# Patient Record
Sex: FEMALE | Race: BLACK OR AFRICAN AMERICAN | Employment: UNEMPLOYED | URBAN - METROPOLITAN AREA
[De-identification: names, ages, dates, MRNs, and addresses within clinical notes are randomized per-mention and may not be internally consistent; named-entity substitution may affect disease eponyms.]

---

## 2017-01-04 ENCOUNTER — TRANSFERRED RECORDS (OUTPATIENT)
Dept: HEALTH INFORMATION MANAGEMENT | Facility: CLINIC | Age: 37
End: 2017-01-04

## 2017-01-18 ENCOUNTER — TELEPHONE (OUTPATIENT)
Dept: ENDOCRINOLOGY | Facility: CLINIC | Age: 37
End: 2017-01-18

## 2017-01-18 NOTE — TELEPHONE ENCOUNTER
Incoming call from call center, spoke with pt, stating she is out of meds of Cortef and Levo, called [facility], they have 1 more refill of 3 months supply, they will dispense it today, reviewed scheduling options with pt and importance of being seen annually for further refills, scheduled return with Dr. Timmons on 3/24/17 1600. Mailed appt reminder with clinic address as well.

## 2017-03-01 DIAGNOSIS — E23.0 PANHYPOPITUITARISM (H): ICD-10-CM

## 2017-03-03 RX ORDER — LEVOTHYROXINE SODIUM 125 UG/1
125 TABLET ORAL DAILY
Qty: 90 TABLET | Refills: 3 | Status: SHIPPED | OUTPATIENT
Start: 2017-03-03 | End: 2018-04-05

## 2017-03-03 NOTE — TELEPHONE ENCOUNTER
levothyroxine (SYNTHROID/LEVOTHROID)       Last Written Prescription Date:  1-20-16  Last Fill Quantity: 90,   # refills: 3  Last Office Visit : 5-8-2015  Future Office visit:  3-24-17    Kathleen M Doege RN

## 2017-03-24 ENCOUNTER — OFFICE VISIT (OUTPATIENT)
Dept: ENDOCRINOLOGY | Facility: CLINIC | Age: 37
End: 2017-03-24

## 2017-03-24 VITALS
HEART RATE: 79 BPM | HEIGHT: 66 IN | SYSTOLIC BLOOD PRESSURE: 126 MMHG | DIASTOLIC BLOOD PRESSURE: 82 MMHG | WEIGHT: 197.2 LBS | BODY MASS INDEX: 31.69 KG/M2

## 2017-03-24 DIAGNOSIS — E66.9 OBESITY, UNSPECIFIED OBESITY SEVERITY, UNSPECIFIED OBESITY TYPE: ICD-10-CM

## 2017-03-24 DIAGNOSIS — R51.9 NONINTRACTABLE HEADACHE, UNSPECIFIED CHRONICITY PATTERN, UNSPECIFIED HEADACHE TYPE: ICD-10-CM

## 2017-03-24 DIAGNOSIS — R79.89 LOW IGF-1 LEVEL: ICD-10-CM

## 2017-03-24 DIAGNOSIS — R73.03 PRE-DIABETES: ICD-10-CM

## 2017-03-24 DIAGNOSIS — J35.1 ENLARGED TONSILS: ICD-10-CM

## 2017-03-24 DIAGNOSIS — E23.0 PANHYPOPITUITARISM (H): ICD-10-CM

## 2017-03-24 DIAGNOSIS — J35.1 ENLARGED TONSILS: Primary | ICD-10-CM

## 2017-03-24 LAB
HBA1C MFR BLD: 6 % (ref 4.3–6)
OSMOLALITY SERPL: 293 MMOL/KG (ref 275–295)
OSMOLALITY UR: 415 MMOL/KG (ref 100–1200)
PROLACTIN SERPL-MCNC: 2 UG/L (ref 3–27)
SODIUM SERPL-SCNC: 140 MMOL/L (ref 133–144)

## 2017-03-24 RX ORDER — HYDROCORTISONE 5 MG/1
TABLET ORAL
Qty: 400 TABLET | Refills: 3 | Status: SHIPPED | OUTPATIENT
Start: 2017-03-24 | End: 2017-03-27

## 2017-03-24 RX ORDER — NORETHINDRONE ACETATE AND ETHINYL ESTRADIOL .02; 1 MG/1; MG/1
1 TABLET ORAL DAILY
Qty: 28 TABLET | Refills: 11 | Status: SHIPPED | OUTPATIENT
Start: 2017-03-24 | End: 2019-07-08

## 2017-03-24 NOTE — LETTER
3/24/2017     RE: Octavio Idd  [address] [place] [address]     Dear Colleague,    Thank you for referring your patient, Octavio Jesus, to the [facility] ENDOCRINOLOGY at [facility]. Please see a copy of my visit note below.    Assessment   Panhypopituitarism   1. Central hypothyroidism.  Treat to high normal Free T4 target.  Free T4 was at target 6/16    2. partially empty sella - as per # 1, 3, 4, 5     3. 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 Rx for shirin 1/20 which is the OCP she had been getting in [place].      4. secondary adrenal insufficiency - New Rx for present dose HC 10/5 dose - QS for 3 months (# 400). We will call her pharmacy about lower quantities they have been dispensing.    5. Low IGF-1 consistent with possible GH deficiency. Current status unknown - repeat IGF1 and prolactin today    Obesity-     Headaches- repeat MRI     Decreased libido. Discussed.      Pre-diabetes based on labs obtained after the appt - not discussed.  Family history of DM.      Large tonsils. I had previously considered this might be causing her sleep apnea.  She says she has seen ENT in [facility] and was advised to have tonsillectomy. She is seeking 2nd opinion. Refer to ENT here .    Lyla Timmons MD.      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  She has had amenorrhea since the birth of her last child in 2001. The delivery had significant associated hemorrhage and required blood transfusion. Subsequently, she did not have another period. She breast fed the baby for a 2-3 weeks at most, was unable to continue.  I started Octavio on therapy in 2008.     She was last seen by me 5/15. At  that time she was on   HC 10 mg and 5 mg afternoon  LT4 125 mcg/day  She continues on these drugs.      I can see she saw Philomena Horn 1//17- had pelvic exam. She was off estrogen at the time.  Today she is showing me a package of OCPs - Holli 1/20 OCPS - she says she took them in the past and they helped her symptoms. She is asking me for Rx for this, preferred it to the premphase.     She carried in labs  6/20/16: TSH 0.015, free T4 1.39, vitamin D 32.6    ROS   Sleeps well at night;   Tired  Weight loss is due to effort - changed diet , works out; she has regained some of the weight she lost.    She saw ENT about tonsils - was advised to have tonsillectomy - she didn't want to do that.  She says she was to have 2nd opinion, not set up  Swallow is better than it used to bed  Cardiac: negative  GI: diarrhea always; if she skips a dose of HC she has diarrhea; no nausea, vomiting , abdominal pain  No menses since off the premphase. She was given another medication by her PCP - she used Holli 1/20 - a while ago - it worked well  Dizzy/ headache x 2 weeks  Decreased libido  Vaginal dryness   Excess urination every time she drinks     Personal Hx   Behavioral history: No tobacco use.   Home environment: No secondhand tobacco smoke in home.   ; lives in [place]; works 10 hour days 6-7 days/week, standing    Family history  Family History   Problem Relation Age of Onset     DIABETES Mother      Hypertension Father      PMH   Past Medical History:   Diagnosis Date     Central hypothyroidism      Empty sella (H)     partially empty     Hypogonadotropic hypogonadism (H)      Secondary adrenal insufficiency (H)      Patrick syndrome (H) 2001     Current Outpatient Prescriptions   Medication Sig Dispense Refill     VITAMIN D, CHOLECALCIFEROL, PO Take 50,000 Units by mouth once a week       hydrocortisone (CORTEF) 5 MG tablet 10 mg in the am and 5 mg in the afternoon.  Sick days take 20 mg three times/day for 1-2 days then  "back to baseline dose. 400 tablet 3     norethindrone-ethinyl estradiol (MICROGESTIN 1/20) 1-20 MG-MCG per tablet Take 1 tablet by mouth daily 28 tablet 11     levothyroxine (SYNTHROID/LEVOTHROID) 125 MCG tablet Take 1 tablet (125 mcg) by mouth daily 90 tablet 3     [DISCONTINUED] hydrocortisone (CORTEF) 5 MG tablet 10 mg in the am and 5 mg in the afternoon.  Sick days take 20 mg three times/day for 1-2 days then back to baseline dose. 100 tablet 3     Physical Exam   GENERAL: pleasant  young woman in no apparent distress.    /82 (BP Location: Right arm, Patient Position: Chair, Cuff Size: Adult Large)  Pulse 79  Ht 1.676 m (5' 6\")  Wt 89.4 kg (197 lb 3.2 oz)  BMI 31.83 kg/m2  SKIN: normal color, temperature   HEENT: PER, no scleral icterus, eyelid retraction, stare, lid lag, proptosis or conjunctival injection.   MOUTH: large tonsils bilaterally, right larger than left  NECK no goiter ; no cervical adenopathy  LUNGS: clear bilaterally   CARDIAC: RRR S1 S2   NEURO: Alert, responds appropriately to questions,moves all extremities, gait normal, no tremor   EXT: no pitting edema     DATA REVIEW:    Results for JHOANA LR (MRN [id number]) as of 3/23/2017 21:00   Ref. Range 11/22/2013 15:44 5/13/2014 00:00 5/8/2015 14:49   Potassium Latest Ref Range: 3.4 - 5.3 mmol/L   4.0   Bilirubin Total Latest Ref Range: 0.2 - 1.3 mg/dL   0.6   ALT Latest Ref Range: 0 - 50 U/L   40   AST Latest Ref Range: 0 - 45 U/L   33   Hemoglobin A1C Latest Ref Range: 4.3 - 6.0 % 5.2  6.0   Bilirubin Direct Latest Ref Range: 0.0 - 0.2 mg/dL   0.1   Estradiol Latest Units: pg/mL <30...     Prolactin Latest Ref Range: 3 - 27 ug/L 2 (L)     T4 Free Latest Ref Range: 0.76 - 1.46 ng/dL 1.29  1.52 (H)   TSH Latest Ref Range: 0.4 - 5.0 mU/L <0.02 (L)     Ins Growth Factor 1 Latest Ref Range: 103 - 391 ng/ml 27 (L)         Again, thank you for allowing me to participate in the care of your patient.      Sincerely,    Lyla Timmons, " MD

## 2017-03-24 NOTE — MR AVS SNAPSHOT
After Visit Summary   3/24/2017    Octavio Jesus    MRN: [id number]           Patient Information     Date Of Birth          1980        Visit Information        Provider Department      3/24/2017 3:45 PM Jaja Todd Lynn A, MD [facility] Endocrinology        Today's Diagnoses     Enlarged tonsils    -  1    Panhypopituitarism (H)        Nonintractable headache, unspecified chronicity pattern, unspecified headache type           Follow-ups after your visit        Additional Services     OTOLARYNGOLOGY REFERRAL       Your provider has referred you to: [facility]: Adult Ear, Nose [facility] Throat Clinic (Otolaryngology) - [place] [phone]  [email or web address]    Please be aware that coverage of these services is subject to the terms and limitations of your health insurance plan.  Call member services at your health plan with any benefit or coverage questions.      Please bring the following with you to your appointment:    (1) Any X-Rays, CTs or MRIs which have been performed.  Contact the facility where they were done to arrange for  prior to your scheduled appointment.   (2) List of current medications  (3) This referral request   (4) Any documents/labs given to you for this referral                  Follow-up notes from your care team     Return in about 6 months (around 9/24/2017).      Your next 10 appointments already scheduled     May 04, 2017 10:00 AM CDT   (Arrive by 9:45 AM)   New Patient Visit with Natalya Kim MD   [facility] Ear Nose and Throat ([facility] [place])    [address] [address] [phone]            May 04, 2017 12:00 PM CDT   MR BRAIN W/O & W CONTRAST with UUMR1   [facility] [place] ([facility] [place])    [address] [facility] [address] [phone]           Take your medicines as  usual, unless your doctor tells you not to. Bring a list of your current medicines to your exam (including vitamins, minerals and over-the-counter drugs).  You will be given intravenous contrast for this exam. To prepare:   The day before your exam, drink extra fluids at least six 8-ounce glasses (unless your doctor tells you to restrict your fluids).   Have a blood test (creatinine test) within 30 days of your exam. Go to your clinic or Diagnostic Imaging Department for this test.  The MRI machine uses a strong magnet. Please wear clothes without metal (snaps, zippers). A sweatsuit works well, or we may give you a hospital gown.  Please remove any body piercings and hair extensions before you arrive. You will also remove watches, jewelry, hairpins, wallets, dentures, partial dental plates and hearing aids. You may wear contact lenses, and you may be able to wear your rings. We have a safe place to keep your personal items, but it is safer to leave them at home.   **IMPORTANT** THE INSTRUCTIONS BELOW ARE ONLY FOR THOSE PATIENTS WHO HAVE BEEN TOLD THEY WILL RECEIVE SEDATION OR GENERAL ANESTHESIA DURING THEIR MRI PROCEDURE:  IF YOU WILL RECEIVE SEDATION (take medicine to help you relax during your exam):   You must get the medicine from your doctor before you arrive. Bring the medicine to the exam. Do not take it at home.   Arrive one hour early. Bring someone who can take you home after the test. Your medicine will make you sleepy. After the exam, you may not drive, take a bus or take a taxi by yourself.   No eating 8 hours before your exam. You may have clear liquids up until 4 hours before your exam. (Clear liquids include water, clear tea, black coffee and fruit juice without pulp.)  IF YOU WILL RECEIVE ANESTHESIA (be asleep for your exam):   Arrive 1 1/2 hours early. Bring someone who can take you home after the test. You may not drive, take a bus or take a taxi by yourself.   No eating 8 hours before your exam.  You may have clear liquids up until 4 hours before your exam. (Clear liquids include water, clear tea, black coffee and fruit juice without pulp.)  Please call the Imaging Department at your exam site with any questions.            Sep 26, 2017  1:30 PM CDT   (Arrive by 1:15 PM)   RETURN ENDOCRINE with Lyla Timmons MD   [facility] Endocrinology ([facility])    [address] [place] [id number] [phone]              Future tests that were ordered for you today     Open Future Orders        Priority Expected Expires Ordered    MRI Brain-PITUITARY  w & w/o contrast Routine  10/20/2017 3/24/2017    Hemoglobin A1c Routine  3/24/2018 3/24/2017    Osmolality Routine  3/24/2018 3/24/2017    Sodium Routine  3/24/2018 3/24/2017    Osmolality urine Routine  3/24/2018 3/24/2017    Insulin growth factor 1 Routine  3/24/2018 3/24/2017    Prolactin Routine  3/24/2018 3/24/2017            Who to contact     Please call your clinic at [phone] to:    Ask questions about your health    Make or cancel appointments    Discuss your medicines    Learn about your test results    Speak to your doctor   If you have compliments or concerns about an experience at your clinic, or if you wish to file a complaint, please contact [facility] Patient Relations at [phone] or email us at [email or web address]         Additional Information About Your Visit        [employer] Information     SendGridt is an electronic gateway that provides easy, online access to your medical records. With [employer], you can request a clinic appointment, read your test results, renew a prescription or communicate with your care team.     To sign up for SendGridt visit the website at [email or web address]   You will be asked to enter the access code listed below, as well as some personal information. Please follow the directions to create your username and password.    "  Your access code is: [email or web address]  Expires: 2017  6:00 PM     Your access code will  in 90 days. If you need help or a new code, please contact your [facility] or call [phone] for assistance.        Care EveryWhere ID     This is your Care EveryWhere ID. This could be used by other organizations to access your [place] medical records  [id number]        Your Vitals Were     Pulse Height BMI (Body Mass Index)             79 1.676 m (5' 6\") 31.83 kg/m2          Blood Pressure from Last 3 Encounters:   17 126/82   05/08/15 121/78   13 136/78    Weight from Last 3 Encounters:   17 89.4 kg (197 lb 3.2 oz)   05/08/15 96.8 kg (213 lb 8 oz)   13 92.7 kg (204 lb 6.4 oz)              We Performed the Following     OTOLARYNGOLOGY REFERRAL          Today's Medication Changes          These changes are accurate as of: 3/24/17  4:48 PM.  If you have any questions, ask your nurse or doctor.               Start taking these medicines.        Dose/Directions    norethindrone-ethinyl estradiol 1-20 MG-MCG per tablet   Commonly known as:  MICROGESTIN 1/20   Used for:  Panhypopituitarism (H), Enlarged tonsils, Nonintractable headache, unspecified chronicity pattern, unspecified headache type   Started by:  Lyla Timmons MD        Dose:  1 tablet   Take 1 tablet by mouth daily   Quantity:  28 tablet   Refills:  11            Where to get your medicines      These medications were sent to [id number] - [place] - [address] [place] [address]     Phone:  [phone]     hydrocortisone 5 MG tablet    norethindrone-ethinyl estradiol 1-20 MG-MCG per tablet                Primary Care Provider Office Phone # Fax #    Philomena Horn [phone]       [address] [address]        Thank you!     Thank you for choosing [facility]  for your care. Our goal is always to " provide you with excellent care. Hearing back from our patients is one way we can continue to improve our services. Please take a few minutes to complete the written survey that you may receive in the mail after your visit with us. Thank you!             Your Updated Medication List - Protect others around you: Learn how to safely use, store and throw away your medicines at www.disposemymeds.org.          This list is accurate as of: 3/24/17  4:48 PM.  Always use your most recent med list.                   Brand Name Dispense Instructions for use    hydrocortisone 5 MG tablet    CORTEF    400 tablet    10 mg in the am and 5 mg in the afternoon.  Sick days take 20 mg three times/day for 1-2 days then back to baseline dose.       levothyroxine 125 MCG tablet    SYNTHROID/LEVOTHROID    90 tablet    Take 1 tablet (125 mcg) by mouth daily       norethindrone-ethinyl estradiol 1-20 MG-MCG per tablet    MICROGESTIN 1/20    28 tablet    Take 1 tablet by mouth daily       VITAMIN D (CHOLECALCIFEROL) PO      Take 50,000 Units by mouth once a week

## 2017-03-24 NOTE — PROGRESS NOTES
Assessment   Panhypopituitarism   1. Central hypothyroidism.  Treat to high normal Free T4 target.  Free T4 was at target 6/16    2. partially empty sella - as per # 1, 3, 4, 5     3. 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 Rx for holli 1/20 which is the OCP she had been getting in [place].      4. secondary adrenal insufficiency - New Rx for present dose HC 10/5 dose - QS for 3 months (# 400). We will call her pharmacy about lower quantities they have been dispensing.    5. Low IGF-1 consistent with possible GH deficiency. Current status unknown - repeat IGF1 and prolactin today    Obesity-     Headaches- repeat MRI     Decreased libido. Discussed.      Pre-diabetes based on labs obtained after the appt - not discussed.  Family history of DM.      Large tonsils. I had previously considered this might be causing her sleep apnea.  She says she has seen ENT in [facility] and was advised to have tonsillectomy. She is seeking 2nd opinion. Refer to ENT here .    Lyla Timmons MD.      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  She has had amenorrhea since the birth of her last child in 2001. The delivery had significant associated hemorrhage and required blood transfusion. Subsequently, she did not have another period. She breast fed the baby for a 2-3 weeks at most, was unable to continue.  I started Octavio on therapy in 2008.     She was last seen by me 5/15. At that time she was on   HC 10 mg and 5 mg afternoon  LT4 125 mcg/day  She continues on these drugs.      I can see she saw Philomena Kory 1//17- had pelvic exam. She was off estrogen at the time.  Today she is showing me a package of OCPs - Holli 1/20 OCPS - she says she took them  in the past and they helped her symptoms. She is asking me for Rx for this, preferred it to the premphase.     She carried in labs  6/20/16: TSH 0.015, free T4 1.39, vitamin D 32.6    ROS   Sleeps well at night;   Tired  Weight loss is due to effort - changed diet , works out; she has regained some of the weight she lost.    She saw ENT about tonsils - was advised to have tonsillectomy - she didn't want to do that.  She says she was to have 2nd opinion, not set up  Swallow is better than it used to bed  Cardiac: negative  GI: diarrhea always; if she skips a dose of HC she has diarrhea; no nausea, vomiting , abdominal pain  No menses since off the premphase. She was given another medication by her PCP - she used Holli 1/20 - a while ago - it worked well  Dizzy/ headache x 2 weeks  Decreased libido  Vaginal dryness   Excess urination every time she drinks     Personal Hx   Behavioral history: No tobacco use.   Home environment: No secondhand tobacco smoke in home.   ; lives in [place]; works 10 hour days 6-7 days/week, standing    Family history  Family History   Problem Relation Age of Onset     DIABETES Mother      Hypertension Father      PMH   Past Medical History:   Diagnosis Date     Central hypothyroidism      Empty sella (H)     partially empty     Hypogonadotropic hypogonadism (H)      Secondary adrenal insufficiency (H)      Patrick syndrome (H) 2001     Current Outpatient Prescriptions   Medication Sig Dispense Refill     VITAMIN D, CHOLECALCIFEROL, PO Take 50,000 Units by mouth once a week       hydrocortisone (CORTEF) 5 MG tablet 10 mg in the am and 5 mg in the afternoon.  Sick days take 20 mg three times/day for 1-2 days then back to baseline dose. 400 tablet 3     norethindrone-ethinyl estradiol (MICROGESTIN 1/20) 1-20 MG-MCG per tablet Take 1 tablet by mouth daily 28 tablet 11     levothyroxine (SYNTHROID/LEVOTHROID) 125 MCG tablet Take 1 tablet (125 mcg) by mouth daily 90 tablet 3      "[DISCONTINUED] hydrocortisone (CORTEF) 5 MG tablet 10 mg in the am and 5 mg in the afternoon.  Sick days take 20 mg three times/day for 1-2 days then back to baseline dose. 100 tablet 3     Physical Exam   GENERAL: pleasant  young woman in no apparent distress.    /82 (BP Location: Right arm, Patient Position: Chair, Cuff Size: Adult Large)  Pulse 79  Ht 1.676 m (5' 6\")  Wt 89.4 kg (197 lb 3.2 oz)  BMI 31.83 kg/m2  SKIN: normal color, temperature   HEENT: PER, no scleral icterus, eyelid retraction, stare, lid lag, proptosis or conjunctival injection.   MOUTH: large tonsils bilaterally, right larger than left  NECK no goiter ; no cervical adenopathy  LUNGS: clear bilaterally   CARDIAC: RRR S1 S2   NEURO: Alert, responds appropriately to questions,moves all extremities, gait normal, no tremor   EXT: no pitting edema     DATA REVIEW:    Results for JHOANA LR (MRN [id number]) as of 3/29/2017 23:02   Ref. Range 3/24/2017 17:17 3/24/2017 17:19   Sodium Latest Ref Range: 133 - 144 mmol/L 140    Hemoglobin A1C Latest Ref Range: 4.3 - 6.0 % 6.0    Osmolality Latest Ref Range: 275 - 295 mmol/kg 293    Prolactin Latest Ref Range: 3 - 27 ug/L 2 (L)    Urine Osmolality Latest Ref Range: 100 - 1200 mmol/kg  415   Ins Growth Factor 1 Latest Ref Range: 80 - 277 ng/ml 37 (L)      Results for JHOANA LR (MRN [id number]) as of 3/23/2017 21:00   Ref. Range 11/22/2013 15:44 5/13/2014 00:00 5/8/2015 14:49   Potassium Latest Ref Range: 3.4 - 5.3 mmol/L   4.0   Bilirubin Total Latest Ref Range: 0.2 - 1.3 mg/dL   0.6   ALT Latest Ref Range: 0 - 50 U/L   40   AST Latest Ref Range: 0 - 45 U/L   33   Hemoglobin A1C Latest Ref Range: 4.3 - 6.0 % 5.2  6.0   Bilirubin Direct Latest Ref Range: 0.0 - 0.2 mg/dL   0.1   Estradiol Latest Units: pg/mL <30...     Prolactin Latest Ref Range: 3 - 27 ug/L 2 (L)     T4 Free Latest Ref Range: 0.76 - 1.46 ng/dL 1.29  1.52 (H)   TSH Latest Ref Range: 0.4 - 5.0 mU/L <0.02 (L)     Ins Growth " Factor 1 Latest Ref Range: 103 - 391 ng/ml 27 (L)

## 2017-03-24 NOTE — NURSING NOTE
"Chief Complaint   Patient presents with     RECHECK     PANHYPOPITUITARISM F/U        Initial /82 (BP Location: Right arm, Patient Position: Chair, Cuff Size: Adult Large)  Pulse 79  Ht 1.676 m (5' 6\")  Wt 89.4 kg (197 lb 3.2 oz)  BMI 31.83 kg/m2 Estimated body mass index is 31.83 kg/(m^2) as calculated from the following:    Height as of this encounter: 1.676 m (5' 6\").    Weight as of this encounter: 89.4 kg (197 lb 3.2 oz).  Medication Reconciliation: complete         Renae Mcbride, BRENDON     "

## 2017-03-25 ENCOUNTER — TELEPHONE (OUTPATIENT)
Dept: ENDOCRINOLOGY | Facility: CLINIC | Age: 37
End: 2017-03-25

## 2017-03-25 NOTE — TELEPHONE ENCOUNTER
I called [facility] in [place]. 3/13/2017 issue of 60 tablets was an insurance issue. Message to Dr. Timmons. As insurance will not accept and amount  Plus sick days.   Ordered for 300 tablets a 90 day supply at (3 tabs per day and extra for sick day).

## 2017-03-27 ENCOUNTER — TELEPHONE (OUTPATIENT)
Dept: ENDOCRINOLOGY | Facility: CLINIC | Age: 37
End: 2017-03-27

## 2017-03-27 DIAGNOSIS — R51.9 NONINTRACTABLE HEADACHE, UNSPECIFIED CHRONICITY PATTERN, UNSPECIFIED HEADACHE TYPE: ICD-10-CM

## 2017-03-27 DIAGNOSIS — J35.1 ENLARGED TONSILS: ICD-10-CM

## 2017-03-27 DIAGNOSIS — E23.0 PANHYPOPITUITARISM (H): ICD-10-CM

## 2017-03-27 RX ORDER — HYDROCORTISONE 5 MG/1
TABLET ORAL
Qty: 400 TABLET | Refills: 3 | Status: SHIPPED | OUTPATIENT
Start: 2017-03-27 | End: 2018-04-28

## 2017-03-27 RX ORDER — HYDROCORTISONE 5 MG/1
TABLET ORAL
Qty: 400 TABLET | Refills: 3 | Status: SHIPPED | OUTPATIENT
Start: 2017-03-27 | End: 2017-03-27

## 2017-03-27 NOTE — TELEPHONE ENCOUNTER
----- Message from Lyla Timmons MD sent at 3/24/2017  4:06 PM CDT -----  Regarding: please call her pharmacy  Please call her pharmacy about the Rx for hydrocoritsone, which is written as 100 tablets, which should last 1 month (since she uses 3 tablets/day), but they gave her 60 tablets on the 3/13/17 Rx she showed me ( Rx # [id number])    Why are they giving her 60 instead of the ordered # ?    I am changing the order to be a 3 month supply at a time, which will be 360 tablets  (90 x3 ) plus extra for sickness .   I am going to write it as 400 tablets at a time.      Lyla Timmons

## 2017-03-28 LAB — IGF-I BLD-MCNC: 37 NG/ML (ref 80–277)

## 2017-03-30 ENCOUNTER — TELEPHONE (OUTPATIENT)
Dept: ENDOCRINOLOGY | Facility: CLINIC | Age: 37
End: 2017-03-30

## 2017-03-30 NOTE — TELEPHONE ENCOUNTER
I could not reach Octavio  by phone  but left a brief message and mailed the  Results letter. She is scheduled for the MRI  next week.

## 2017-03-30 NOTE — TELEPHONE ENCOUNTER
----- Message from Lyla Timmons MD sent at 3/29/2017 11:25 PM CDT -----  Regarding: RE: calling for results   I don't believe she has had the MRI yet.  You can read her the letter that I wrote tonight.   I am still awaiting the MRI.    Lyla Timmons  ----- Message -----     From: Eva Celestin RN     Sent: 3/29/2017   2:27 PM       To: Lyla Timmons MD  Subject: calling for results                              MRI and  Labs done 3/24/17 calling for results. I told her you  have not read them yet but i would send you  a message .

## 2018-01-09 ENCOUNTER — TELEPHONE (OUTPATIENT)
Dept: ENDOCRINOLOGY | Facility: CLINIC | Age: 38
End: 2018-01-09

## 2018-01-09 NOTE — TELEPHONE ENCOUNTER
Received pt call requesting pharmacy change to [facility] [address] [place] [address]. Lm on pts vm to call [facility].

## 2018-01-24 ENCOUNTER — OFFICE VISIT (OUTPATIENT)
Dept: ENDOCRINOLOGY | Facility: CLINIC | Age: 38
End: 2018-01-24
Payer: MEDICAID

## 2018-01-24 VITALS
SYSTOLIC BLOOD PRESSURE: 127 MMHG | BODY MASS INDEX: 32.35 KG/M2 | HEART RATE: 78 BPM | DIASTOLIC BLOOD PRESSURE: 84 MMHG | WEIGHT: 201.3 LBS | HEIGHT: 66 IN

## 2018-01-24 DIAGNOSIS — R73.03 PRE-DIABETES: ICD-10-CM

## 2018-01-24 DIAGNOSIS — R79.89 LOW IGF-1 LEVEL: ICD-10-CM

## 2018-01-24 DIAGNOSIS — E23.0 PANHYPOPITUITARISM (H): ICD-10-CM

## 2018-01-24 DIAGNOSIS — E23.0 PANHYPOPITUITARISM (H): Primary | ICD-10-CM

## 2018-01-24 DIAGNOSIS — E55.9 HYPOVITAMINOSIS D: ICD-10-CM

## 2018-01-24 DIAGNOSIS — E66.9 OBESITY, UNSPECIFIED OBESITY SEVERITY, UNSPECIFIED OBESITY TYPE: ICD-10-CM

## 2018-01-24 DIAGNOSIS — E55.9 HYPOVITAMINOSIS D: Primary | ICD-10-CM

## 2018-01-24 LAB
CREAT SERPL-MCNC: 0.52 MG/DL (ref 0.52–1.04)
GFR SERPL CREATININE-BSD FRML MDRD: >90 ML/MIN/1.7M2
HBA1C MFR BLD: 5.9 % (ref 4.3–6)
POTASSIUM SERPL-SCNC: 3.6 MMOL/L (ref 3.4–5.3)
SODIUM SERPL-SCNC: 135 MMOL/L (ref 133–144)
T4 FREE SERPL-MCNC: 0.87 NG/DL (ref 0.76–1.46)

## 2018-01-24 PROCEDURE — 84305 ASSAY OF SOMATOMEDIN: CPT

## 2018-01-24 PROCEDURE — 82306 VITAMIN D 25 HYDROXY: CPT

## 2018-01-24 NOTE — LETTER
Patient:  Octavio Jesus  :   1980  MRN:     [id number]         Idd  [address] [place] [id number]        2018    Dear ,    We are writing to inform you of your test results.    The vitamin D remains low. I recommend you take over the counter vitamin D3 2000 IU/day.  I will submit Rx for this over the counter medication in case you need documentation for income taxes.    I see there has been a change of pharmacy on the system. Do you want the refills for the hydrocortisone and levothyroxine sent to the new pharmacy ([facility])?  Please confirm either via W-21 note or call to my nurses ([phone]).        Resulted Orders   Hemoglobin A1c   Result Value Ref Range    Hemoglobin A1C 5.9 4.3 - 6.0 %   T4 free   Result Value Ref Range    T4 Free 0.87 0.76 - 1.46 ng/dL   Sodium   Result Value Ref Range    Sodium 135 133 - 144 mmol/L   Potassium   Result Value Ref Range    Potassium 3.6 3.4 - 5.3 mmol/L   Creatinine   Result Value Ref Range    Creatinine 0.52 0.52 - 1.04 mg/dL    GFR Estimate >90 >60 mL/min/1.7m2      Comment:      Non  GFR Calc    GFR Estimate If Black >90 >60 mL/min/1.7m2      Comment:      African American GFR Calc   Vitamin D Deficiency (D3 Only)   Result Value Ref Range    Vitamin D Deficiency screening 18 (L) 20 - 75 ug/L      Comment:      Season, race, dietary intake, and treatment affect the concentration of   25-hydroxy-Vitamin D. Values may decrease during winter months and increase   during summer months. Values 20-29 ug/L may indicate Vitamin D insufficiency   and values <20 ug/L may indicate Vitamin D deficiency.  Vitamin D determination is routinely performed by an immunoassay specific for   25 hydroxyvitamin D3.  If an individual is on vitamin D2 (ergocalciferol)   supplementation, please specify 25 OH vitamin D2 and D3 level determination by   LCMSMS test [id number].         Sincerely,    Lyla Timmons,  MD    [id number]  1980

## 2018-01-24 NOTE — LETTER
1/24/2018       RE: Octavio Jesus  [address] [place] [id number]     Dear Colleague,    Thank you for referring your patient, Octavio Jesus, to the [facility] ENDOCRINOLOGY at [facility]. Please see a copy of my visit note below.    Assessment   Panhypopituitarism   1. Central hypothyroidism.  Treat to high normal Free T4 target.  Free T4 was at target 6/16  The free T4 obtained on labs following the appt is low normal    2. partially empty sella - as per # 1, 3, 4, 5     3. 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 She is back on OCP    4. secondary adrenal insufficiency -  HC 10/5 dose -     5. Low IGF-1 consistent with possible GH deficiency.  - She has deficiency of 3 other pituitary hormones. Rx for GH 0.2 mg/day.  Start prior auth process    Obesity- is a risk for the pre-diabetes.  Recommend lifestyle.     Headaches- repeat MRI was ordered last year and not done.  I think it is OK ot not repeat this study at this time.     Pre-diabetes based on labs last year; Family history of DM.  Repeat A1c following the appt continues to show this.   Recommend lifestyle.     Large tonsils. I had previously considered this might be causing her sleep apnea.  She says she has seen ENT in [facility] and was advised to have tonsillectomy. She is seeking 2nd opinion. Refer to ENT here .    Hypovitaminosis D noted  Ln labs following the appt- recommend vitamin D 2000 IU/day     Lyla Timmons MD.      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  She has had amenorrhea since the birth of her last child in 2001. The delivery had significant associated hemorrhage and required blood  "transfusion. Subsequently, she did not have another period. She breast fed the baby for a 2-3 weeks at most, was unable to continue.  I started Octavio on therapy in 2008.     She was last seen by me 3/17. At that time she was on   HC 10 mg and 5 mg afternoon  LT4 125 mcg/day  OCP  She had Rx for GH in in 2012. We tried again for prior auth in 2015, but I can't tell what happened, why she isn't on it.     ROS   Interested in tonsils evaluation  Interested in GH    Personal Hx   Behavioral history: No tobacco use.   Home environment: No secondhand tobacco smoke in home.   ; lives in [place]; works 4-6 hours shifts;     Family history  Family History   Problem Relation Age of Onset     DIABETES Mother      Hypertension Father      PMH   Past Medical History:   Diagnosis Date     Central hypothyroidism      Empty sella (H)     partially empty     Hypogonadotropic hypogonadism (H)      Secondary adrenal insufficiency (H)      Patrick syndrome (H) 2001     Current Outpatient Prescriptions   Medication Sig Dispense Refill     somatropin 5 MG/1.5ML SOLN Inject 0.2 mg Subcutaneous daily Inject 0.6 mg subcutaneous daily. 1.5 mL 5     hydrocortisone (CORTEF) 5 MG tablet Take  2 tablets ( 10 mg) in the am and 1 tablet ( 5 mg)  in the afternoon. Plus allow 6( 24 tablets each X 6 / 90 days  )   Sick days take 20 mg three times/day for 1-2 days then back to baseline dose. 400 tablet 3     norethindrone-ethinyl estradiol (MICROGESTIN 1/20) 1-20 MG-MCG per tablet Take 1 tablet by mouth daily 28 tablet 11     levothyroxine (SYNTHROID/LEVOTHROID) 125 MCG tablet Take 1 tablet (125 mcg) by mouth daily 90 tablet 3     Back on OCP x 1 week, was off a long time before that  Hasn't had GH since last here -doesn't know where we stand    Physical Exam   GENERAL: pleasant  young woman in no apparent distress.    /84 (BP Location: Right arm, Patient Position: Sitting, Cuff Size: Adult Large)  Pulse 78  Ht 1.676 m (5' 6\")  Wt " 91.3 kg (201 lb 4.8 oz)  BMI 32.49 kg/m2  SKIN: normal color, temperature   HEENT: PER, no scleral icterus, eyelid retraction, stare, lid lag, proptosis or conjunctival injection.   MOUTH: large tonsils bilaterally, right larger than left  NECK no goiter ; no cervical adenopathy  LUNGS: clear bilaterally   CARDIAC: RRR S1 S2   NEURO: Alert, responds appropriately to questions,moves all extremities, gait normal, no tremor   EXT: no pitting edema     DATA REVIEW:  Results for JHOANA LR (MRN [id number]) as of 1/29/2018 20:00   Ref. Range 3/24/2017 17:17 3/24/2017 17:19 1/24/2018 14:08   Sodium Latest Ref Range: 133 - 144 mmol/L 140  135   Potassium Latest Ref Range: 3.4 - 5.3 mmol/L   3.6   Creatinine Latest Ref Range: 0.52 - 1.04 mg/dL   0.52   GFR Estimate Latest Ref Range: >60 mL/min/1.7m2   >90   GFR Estimate If Black Latest Ref Range: >60 mL/min/1.7m2   >90   Hemoglobin A1C Latest Ref Range: 4.3 - 6.0 % 6.0  5.9   Osmolality Latest Ref Range: 275 - 295 mmol/kg 293     Prolactin Latest Ref Range: 3 - 27 ug/L 2 (L)     T4 Free Latest Ref Range: 0.76 - 1.46 ng/dL   0.87   Urine Osmolality Latest Ref Range: 100 - 1200 mmol/kg  415    Vitamin D Deficiency screening Latest Ref Range: 20 - 75 ug/L   18 (L)   Ins Growth Factor 1 Latest Ref Range: 80 - 277 ng/ml 37 (L)  27 (L)

## 2018-01-24 NOTE — PROGRESS NOTES
Assessment   Panhypopituitarism   1. Central hypothyroidism.  Treat to high normal Free T4 target.  Free T4 was at target 6/16  The free T4 obtained on labs following the appt is low normal    2. partially empty sella - as per # 1, 3, 4, 5     3. 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 She is back on OCP    4. secondary adrenal insufficiency -  HC 10/5 dose -     5. Low IGF-1 consistent with possible GH deficiency.  - She has deficiency of 3 other pituitary hormones. Rx for GH 0.2 mg/day.  Start prior auth process    Obesity- is a risk for the pre-diabetes.  Recommend lifestyle.     Headaches- repeat MRI was ordered last year and not done.  I think it is OK ot not repeat this study at this time.     Pre-diabetes based on labs last year; Family history of DM.  Repeat A1c following the appt continues to show this.   Recommend lifestyle.     Large tonsils. I had previously considered this might be causing her sleep apnea.  She says she has seen ENT in [facility] and was advised to have tonsillectomy. She is seeking 2nd opinion. Refer to ENT here .    Hypovitaminosis D noted  Ln labs following the appt- recommend vitamin D 2000 IU/day     Lyla Timmons MD.      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  She has had amenorrhea since the birth of her last child in 2001. The delivery had significant associated hemorrhage and required blood transfusion. Subsequently, she did not have another period. She breast fed the baby for a 2-3 weeks at most, was unable to continue.  I started Octavio on therapy in 2008.     She was last seen by me 3/17. At that time she was on   HC 10 mg and 5 mg afternoon  LT4 125  "mcg/day  OCP  She had Rx for GH in in 2012. We tried again for prior auth in 2015, but I can't tell what happened, why she isn't on it.       ROS   Interested in tonsils evaluation  Interested in GH      Personal Hx   Behavioral history: No tobacco use.   Home environment: No secondhand tobacco smoke in home.   ; lives in [place]; works 4-6 hours shifts;     Family history  Family History   Problem Relation Age of Onset     DIABETES Mother      Hypertension Father      PMH   Past Medical History:   Diagnosis Date     Central hypothyroidism      Empty sella (H)     partially empty     Hypogonadotropic hypogonadism (H)      Secondary adrenal insufficiency (H)      Patrick syndrome (H) 2001     Current Outpatient Prescriptions   Medication Sig Dispense Refill     somatropin 5 MG/1.5ML SOLN Inject 0.2 mg Subcutaneous daily Inject 0.6 mg subcutaneous daily. 1.5 mL 5     hydrocortisone (CORTEF) 5 MG tablet Take  2 tablets ( 10 mg) in the am and 1 tablet ( 5 mg)  in the afternoon. Plus allow 6( 24 tablets each X 6 / 90 days  )   Sick days take 20 mg three times/day for 1-2 days then back to baseline dose. 400 tablet 3     norethindrone-ethinyl estradiol (MICROGESTIN 1/20) 1-20 MG-MCG per tablet Take 1 tablet by mouth daily 28 tablet 11     levothyroxine (SYNTHROID/LEVOTHROID) 125 MCG tablet Take 1 tablet (125 mcg) by mouth daily 90 tablet 3     Back on OCP x 1 week, was off a long time before that  Hasn't had GH since last here -doesn't know where we stand    Physical Exam   GENERAL: pleasant  young woman in no apparent distress.    /84 (BP Location: Right arm, Patient Position: Sitting, Cuff Size: Adult Large)  Pulse 78  Ht 1.676 m (5' 6\")  Wt 91.3 kg (201 lb 4.8 oz)  BMI 32.49 kg/m2  SKIN: normal color, temperature   HEENT: PER, no scleral icterus, eyelid retraction, stare, lid lag, proptosis or conjunctival injection.   MOUTH: large tonsils bilaterally, right larger than left  NECK no goiter ; no " cervical adenopathy  LUNGS: clear bilaterally   CARDIAC: RRR S1 S2   NEURO: Alert, responds appropriately to questions,moves all extremities, gait normal, no tremor   EXT: no pitting edema     DATA REVIEW:    Results for JHOANA LR (MRN [id number]) as of 1/29/2018 20:00   Ref. Range 3/24/2017 17:17 3/24/2017 17:19 1/24/2018 14:08   Sodium Latest Ref Range: 133 - 144 mmol/L 140  135   Potassium Latest Ref Range: 3.4 - 5.3 mmol/L   3.6   Creatinine Latest Ref Range: 0.52 - 1.04 mg/dL   0.52   GFR Estimate Latest Ref Range: >60 mL/min/1.7m2   >90   GFR Estimate If Black Latest Ref Range: >60 mL/min/1.7m2   >90   Hemoglobin A1C Latest Ref Range: 4.3 - 6.0 % 6.0  5.9   Osmolality Latest Ref Range: 275 - 295 mmol/kg 293     Prolactin Latest Ref Range: 3 - 27 ug/L 2 (L)     T4 Free Latest Ref Range: 0.76 - 1.46 ng/dL   0.87   Urine Osmolality Latest Ref Range: 100 - 1200 mmol/kg  415    Vitamin D Deficiency screening Latest Ref Range: 20 - 75 ug/L   18 (L)   Ins Growth Factor 1 Latest Ref Range: 80 - 277 ng/ml 37 (L)  27 (L)

## 2018-01-24 NOTE — MR AVS SNAPSHOT
After Visit Summary   1/24/2018    Octavio Jesus    MRN: [id number]           Patient Information     Date Of Birth          1980        Visit Information        Provider Department      1/24/2018 12:55 PM Vianey Malik Lynn A, MD [facility]        Today's Diagnoses     Panhypopituitarism (H)    -  1    Low IGF-1 level (H)        Pre-diabetes        Obesity, unspecified obesity severity, unspecified obesity type          Care Instructions    GET LABS TODAY    I WILL SUBMIT THE GROWTH HORMONE PRESCRIPTION FOR PRIOR AUTHORIZATION TODAY.            Follow-ups after your visit        Follow-up notes from your care team     Return in about 1 year (around 1/24/2019).      Future tests that were ordered for you today     Open Future Orders        Priority Expected Expires Ordered    Vitamin D Deficiency (D3 Only) Routine  1/24/2019 1/24/2018    Hemoglobin A1c Routine  1/24/2019 1/24/2018    T4 free Routine  1/24/2019 1/24/2018    Sodium Routine  1/24/2019 1/24/2018    Potassium Routine  1/24/2019 1/24/2018    Creatinine Routine  1/24/2019 1/24/2018    Insulin growth factor 1 Routine  1/24/2019 1/24/2018            Who to contact     Please call your clinic at [phone] to:    Ask questions about your health    Make or cancel appointments    Discuss your medicines    Learn about your test results    Speak to your doctor   If you have compliments or concerns about an experience at your clinic, or if you wish to file a complaint, please contact [facility] Patient Relations at [phone] or email us at [email or web address]         Additional Information About Your Visit        MyChart Information     [employer] is an electronic gateway that provides easy, online access to your medical records. With [employer], you can request a clinic appointment, read your test results, renew a prescription or communicate with your care team.     To sign up for  "MyChart visit the website at [email or web address]   You will be asked to enter the access code listed below, as well as some personal information. Please follow the directions to create your username and password.     Your access code is: [email or web address]  Expires: 4/10/2018  6:30 AM     Your access code will  in 90 days. If you need help or a new code, please contact your [facility] or call [phone] for assistance.        Care EveryWhere ID     This is your Care EveryWhere ID. This could be used by other organizations to access your [facility] medical records  [id number]        Your Vitals Were     Pulse Height BMI (Body Mass Index)             78 1.676 m (5' 6\") 32.49 kg/m2          Blood Pressure from Last 3 Encounters:   18 127/84   17 126/82   05/08/15 121/78    Weight from Last 3 Encounters:   18 91.3 kg (201 lb 4.8 oz)   17 89.4 kg (197 lb 3.2 oz)   05/08/15 96.8 kg (213 lb 8 oz)                 Today's Medication Changes          These changes are accurate as of 18  1:45 PM.  If you have any questions, ask your nurse or doctor.               Start taking these medicines.        Dose/Directions    somatropin 5 MG/1.5ML Soln   Used for:  Panhypopituitarism (H), Low IGF-1 level (H), Pre-diabetes, Obesity, unspecified obesity severity, unspecified obesity type   Started by:  Lyla Timmons MD        Dose:  0.2 mg   Inject 0.2 mg Subcutaneous daily Inject 0.6 mg subcutaneous daily.   Quantity:  1.5 mL   Refills:  5         Stop taking these medicines if you haven't already. Please contact your care team if you have questions.     VITAMIN D (CHOLECALCIFEROL) PO   Stopped by:  Lyla Timmons MD                Where to get your medicines      Some of these will need a paper prescription and others can be bought over the counter.  Ask your nurse if you have questions.     Bring a paper prescription for each of these medications     " somatropin 5 MG/1.5ML Soln                Primary Care Provider Office Phone # Fax #    Philomena Horn [phone]       [facility] [address]   [facility] [address]        Equal Access to Services     RADHA MARIN : Hadii aad ku hadeddio Soomaali, waaxda luqadaha, qaybta kaalmada adeegyada, waxay idiin angien adeermias anguianotimothy muniz. So [facility] [phone].    ATENCIÓN: Si habla español, tiene a nina disposición servicios gratuitos de asistencia lingüística. Llame al [phone].    We comply with applicable federal civil rights laws and [place] laws. We do not discriminate on the basis of race, color, national origin, age, disability, sex, sexual orientation, or gender identity.            Thank you!     Thank you for choosing [facility] ENDOCRINOLOGY  for your care. Our goal is always to provide you with excellent care. Hearing back from our patients is one way we can continue to improve our services. Please take a few minutes to complete the written survey that you may receive in the mail after your visit with us. Thank you!             Your Updated Medication List - Protect others around you: Learn how to safely use, store and throw away your medicines at www.disposemymeds.org.          This list is accurate as of 1/24/18  1:45 PM.  Always use your most recent med list.                   Brand Name Dispense Instructions for use Diagnosis    hydrocortisone 5 MG tablet    CORTEF    400 tablet    Take  2 tablets ( 10 mg) in the am and 1 tablet ( 5 mg)  in the afternoon. Plus allow 6( 24 tablets each X 6 / 90 days  )   Sick days take 20 mg three times/day for 1-2 days then back to baseline dose.    Panhypopituitarism (H), Enlarged tonsils, Nonintractable headache, unspecified chronicity pattern, unspecified headache type       levothyroxine 125 MCG tablet    SYNTHROID/LEVOTHROID    90 tablet    Take 1 tablet (125 mcg) by mouth daily    Panhypopituitarism (H)       norethindrone-ethinyl estradiol  1-20 MG-MCG per tablet    MICROGESTIN 1/20    28 tablet    Take 1 tablet by mouth daily    Panhypopituitarism (H), Enlarged tonsils, Nonintractable headache, unspecified chronicity pattern, unspecified headache type       somatropin 5 MG/1.5ML Soln     1.5 mL    Inject 0.2 mg Subcutaneous daily Inject 0.6 mg subcutaneous daily.    Panhypopituitarism (H), Low IGF-1 level (H), Pre-diabetes, Obesity, unspecified obesity severity, unspecified obesity type

## 2018-01-24 NOTE — NURSING NOTE
"Chief Complaint   Patient presents with     RECHECK     PANHYPOPITUITARUISM F/U        Initial /84 (BP Location: Right arm, Patient Position: Sitting, Cuff Size: Adult Large)  Pulse 78  Ht 1.676 m (5' 6\")  Wt 91.3 kg (201 lb 4.8 oz)  BMI 32.49 kg/m2 Estimated body mass index is 32.49 kg/(m^2) as calculated from the following:    Height as of this encounter: 1.676 m (5' 6\").    Weight as of this encounter: 91.3 kg (201 lb 4.8 oz).  Medication Reconciliation: complete    "

## 2018-01-25 LAB — DEPRECATED CALCIDIOL+CALCIFEROL SERPL-MC: 18 UG/L (ref 20–75)

## 2018-01-26 LAB — IGF-I BLD-MCNC: 27 NG/ML (ref 80–277)

## 2018-01-29 PROBLEM — E55.9 HYPOVITAMINOSIS D: Status: ACTIVE | Noted: 2018-01-29

## 2018-02-16 ENCOUNTER — TELEPHONE (OUTPATIENT)
Dept: ENDOCRINOLOGY | Facility: CLINIC | Age: 38
End: 2018-02-16

## 2018-02-16 NOTE — TELEPHONE ENCOUNTER
----- Message from Eva Celestin RN sent at 2/8/2018  9:15 AM CST -----  Regarding: FW: Test Results Requested - Dr Timmons  Contact: [phone]      ----- Message -----     From: Vilma Glaser     Sent: 2/8/2018   8:29 AM       To: [facility] Triage-  Subject: Test Results Requested - Dr Timmons           The pt would like a call at [phone] to discuss the lab results.     Thanks - Vilma    Please DO NOT send this message and/or reply back to sender.  Call Center Representatives DO NOT respond to messages.

## 2018-04-05 DIAGNOSIS — E23.0 PANHYPOPITUITARISM (H): ICD-10-CM

## 2018-04-07 NOTE — TELEPHONE ENCOUNTER
levothyroxine (SYNTHROID/LEVOTHROID) 125 MCG tablet     Last Written Prescription Date:  3/3/17  Last Fill Quantity: 90,   # refills: 3  Last Office Visit :1/24/18  Future Office visit: none    Routing refill request to provider for review/approval because:  Provider preference

## 2018-04-08 RX ORDER — LEVOTHYROXINE SODIUM 125 UG/1
125 TABLET ORAL DAILY
Qty: 90 TABLET | Refills: 3 | Status: SHIPPED | OUTPATIENT
Start: 2018-04-08 | End: 2019-04-13

## 2018-04-28 ENCOUNTER — TELEPHONE (OUTPATIENT)
Dept: ENDOCRINOLOGY | Facility: CLINIC | Age: 38
End: 2018-04-28

## 2018-04-28 DIAGNOSIS — R51.9 NONINTRACTABLE HEADACHE, UNSPECIFIED CHRONICITY PATTERN, UNSPECIFIED HEADACHE TYPE: ICD-10-CM

## 2018-04-28 DIAGNOSIS — J35.1 ENLARGED TONSILS: ICD-10-CM

## 2018-04-28 DIAGNOSIS — E23.0 PANHYPOPITUITARISM (H): ICD-10-CM

## 2018-04-28 RX ORDER — HYDROCORTISONE 5 MG/1
TABLET ORAL
Qty: 400 TABLET | Refills: 0 | Status: SHIPPED | OUTPATIENT
Start: 2018-04-28 | End: 2018-08-28

## 2018-08-28 DIAGNOSIS — R51.9 NONINTRACTABLE HEADACHE, UNSPECIFIED CHRONICITY PATTERN, UNSPECIFIED HEADACHE TYPE: ICD-10-CM

## 2018-08-28 DIAGNOSIS — E23.0 PANHYPOPITUITARISM (H): ICD-10-CM

## 2018-08-28 DIAGNOSIS — J35.1 ENLARGED TONSILS: ICD-10-CM

## 2018-08-29 RX ORDER — HYDROCORTISONE 5 MG/1
TABLET ORAL
Qty: 400 TABLET | Refills: 0 | Status: SHIPPED | OUTPATIENT
Start: 2018-08-29 | End: 2019-05-15

## 2018-08-29 NOTE — TELEPHONE ENCOUNTER
hydrocortisone (CORTEF) 5 MG    Last Written Prescription Date:  4/28/18  Last Fill Quantity: 400,   # refills: 0  Last Office Visit : 1/24/18  Future Office visit:  NONE    Routing refill request to provider for review/approval

## 2019-04-09 DIAGNOSIS — E23.0 PANHYPOPITUITARISM (H): ICD-10-CM

## 2019-04-11 NOTE — TELEPHONE ENCOUNTER
[facility] Center    Phone Message    May a detailed message be left on voicemail: yes    Reason for Call: Medication Refill Request    Has the patient contacted the pharmacy for the refill? Yes   Name of medication being requested: levothyroxine (SYNTHROID/LEVOTHROID) 125 MCG tablet  Provider who prescribed the medication: *[facility] [facility] [address] [place] [facility] [place] [address] [facility]    Date medication is needed: ASAP      Action Taken: Message routed to:  [facility] Med refills

## 2019-04-13 NOTE — TELEPHONE ENCOUNTER
levothyroxine (SYNTHROID/LEVOTHROID) 125 MCG tablet  Last Written Prescription Date:  4/8/18  Last Fill Quantity: 90,   # refills: 3  Last Office Visit : 1/24/18  Future Office visit:  7/8/19    Routing refill request to provider for review/approval because: provider review preference.

## 2019-04-15 RX ORDER — LEVOTHYROXINE SODIUM 125 UG/1
125 TABLET ORAL DAILY
Qty: 90 TABLET | Refills: 3 | Status: SHIPPED | OUTPATIENT
Start: 2019-04-15 | End: 2020-04-29

## 2019-05-15 DIAGNOSIS — E23.0 PANHYPOPITUITARISM (H): ICD-10-CM

## 2019-05-15 DIAGNOSIS — J35.1 ENLARGED TONSILS: ICD-10-CM

## 2019-05-15 DIAGNOSIS — R51.9 NONINTRACTABLE HEADACHE, UNSPECIFIED CHRONICITY PATTERN, UNSPECIFIED HEADACHE TYPE: ICD-10-CM

## 2019-05-15 NOTE — TELEPHONE ENCOUNTER
hydrocortisone (CORTEF) 5 MG tablet  Last Written Prescription Date:  8/29/18  Last Fill Quantity: 400,   # refills: 0  Last Office Visit :1/24/18  Future Office visit: 7/8/19    levothyroxine (SYNTHROID/LEVOTHROID) 125 MCG tablet  Refills on file.       Routing refill request to provider for review/approval because: pt states she has been taking cortef ongoing . Last written 8/18 #400 tabs.  should have been out for several mths? Requests rf.

## 2019-05-15 NOTE — TELEPHONE ENCOUNTER
[facility] Call Center    Phone Message    May a detailed message be left on voicemail: yes    Reason for Call: Medication Refill Request    Has the patient contacted the pharmacy for the refill? Yes   Name of medication being requested: (1) levothyroxine (SYNTHROID/LEVOTHROID) 125 MCG tablet  AND (2) hydrocortisone (CORTEF) 5 MG tablet  Provider who prescribed the medication: Lyla Timmons  Pharmacy: [facility] on [address]  Date medication is needed: NOW         Action Taken: Message routed to:  [facility] Diabetes Endocrine

## 2019-05-16 RX ORDER — HYDROCORTISONE 5 MG/1
TABLET ORAL
Qty: 400 TABLET | Refills: 0 | Status: SHIPPED | OUTPATIENT
Start: 2019-05-16 | End: 2019-07-08

## 2019-05-28 ENCOUNTER — TELEPHONE (OUTPATIENT)
Dept: ENDOCRINOLOGY | Facility: CLINIC | Age: 39
End: 2019-05-28

## 2019-05-28 NOTE — TELEPHONE ENCOUNTER
[facility]    Phone Message    May a detailed message be left on voicemail: yes     Reason for Call: Medication Question or concern regarding medication   Prescription Clarification  Name of Medication: hydrocortisone (CORTEF) 5 MG tablet  Prescribing Provider: KENROY   Pharmacy: [facility] [address] [place] [address] [address] AT [facility] OF [address]   What on the order needs clarification? Pt states that she called at the beginning of the month and we only gave her a 10 day fill. Pt is requesting for a 30 day fill because she is running out of this and need this right away. Please f/u with pt.     Action Taken: Message routed to:  [facility] endo

## 2019-05-28 NOTE — TELEPHONE ENCOUNTER
Spoke with Alanis in the [facility] pharmacy they did not have the accurate dosage I reviewed the prescription and [facility] will be refilling the prescription. I called patient and left a detailed message and a call back number [phone].

## 2019-07-08 ENCOUNTER — OFFICE VISIT (OUTPATIENT)
Dept: ENDOCRINOLOGY | Facility: CLINIC | Age: 39
End: 2019-07-08

## 2019-07-08 VITALS
HEIGHT: 66 IN | WEIGHT: 198 LBS | HEART RATE: 78 BPM | SYSTOLIC BLOOD PRESSURE: 104 MMHG | BODY MASS INDEX: 31.82 KG/M2 | DIASTOLIC BLOOD PRESSURE: 62 MMHG

## 2019-07-08 DIAGNOSIS — E66.9 OBESITY, UNSPECIFIED OBESITY SEVERITY, UNSPECIFIED OBESITY TYPE: ICD-10-CM

## 2019-07-08 DIAGNOSIS — E55.9 HYPOVITAMINOSIS D: ICD-10-CM

## 2019-07-08 DIAGNOSIS — R73.03 PRE-DIABETES: Primary | ICD-10-CM

## 2019-07-08 DIAGNOSIS — E23.0 PANHYPOPITUITARISM (H): ICD-10-CM

## 2019-07-08 DIAGNOSIS — R73.03 PRE-DIABETES: ICD-10-CM

## 2019-07-08 LAB
HBA1C MFR BLD: 5.9 % (ref 0–5.6)
SODIUM SERPL-SCNC: 135 MMOL/L (ref 133–144)
T4 FREE SERPL-MCNC: 1.14 NG/DL (ref 0.76–1.46)

## 2019-07-08 RX ORDER — HYDROCORTISONE 5 MG/1
TABLET ORAL
Qty: 400 TABLET | Refills: 3 | Status: SHIPPED | OUTPATIENT
Start: 2019-07-08 | End: 2020-07-20

## 2019-07-08 RX ORDER — NORETHINDRONE ACETATE AND ETHINYL ESTRADIOL .02; 1 MG/1; MG/1
1 TABLET ORAL DAILY
Qty: 90 TABLET | Refills: 3 | Status: SHIPPED | OUTPATIENT
Start: 2019-07-08 | End: 2020-08-07

## 2019-07-08 ASSESSMENT — PAIN SCALES - GENERAL: PAINLEVEL: NO PAIN (0)

## 2019-07-08 ASSESSMENT — MIFFLIN-ST. JEOR: SCORE: 1594.87

## 2019-07-08 NOTE — LETTER
7/8/2019       RE: Octavio MARTINEZ Idd  [address] [facility] [address] [facility] [address]     Dear Colleague,    Thank you for referring your patient, Octavio Jesus, to the [facility] ENDOCRINOLOGY at [facility]. Please see a copy of my visit note below.    Assessment   Panhypopituitarism   1. Central hypothyroidism.  Treat to high normal Free T4 target.  Labs following the appt are at target.     2. partially empty sella - as per # 1, 3, 4, 5     3. 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 Refille the Rx for OCP    4. secondary adrenal insufficiency -  HC 10/5 dose - Refilled.      5. Low IGF-1 consistent with possible GH deficiency.  - She has deficiency of 3 other pituitary hormones. We have never succeeded in getting her on GH and she now had no health insurance, which will make it even more prohibitive.    Obesity- is a risk for the pre-diabetes.  Recommend lifestyle.     Pre-diabetes based on labs last year; Family history of DM. Labs following appt continue to show pre-diabetes  Recommend lifestyle.     Hypovitaminosis D - she is on half the vitamin D dose I had previously prescribed. Labs following appt show low normal vitamin D.  Vitamin D may continue at 1000 international unit(s)/day.      Health insurance lack.  Refer to .     Lyla Timmons MD.      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  She has had amenorrhea since the birth of her last child in 2001. The delivery had significant associated hemorrhage and required blood transfusion. Subsequently, she did not have another period. She breast fed the baby for a 2-3 weeks at most, was unable to continue.  I started Octavio on therapy in  "2008. After our last appt I recommended she start vitamin D 2000 international unit(s)/day.      She was last seen by me 1/18.Today she reports she is  on   HC 10 mg and 5 mg afternoon  LT4 125 mcg/day  OCP- needs refill  We have repeatedly been unsuccessful in getting GH.  She reminds me today she has no health insurance.  She is worried about the cost of medication and testing.     Labs:  10/13/18: HgbA1c 5.8%  7/8/19 following appt HgbA1c 5.9%, Na 135, free T4 1.14, [id number]    ROS   Sleep at night usually good  Weight is stable ; bedtime:  8-9 PM; up at 5-6 AM  Cardiac: negative  Respiratory: negative  GI: diarrhea - ; if she forgets the 2nd HC she has abdominal pain and diarrhea - slept on the floor -  Most of the time feels weakness - especially if missing vitamin D  Nocturia 1-2 /night    Past Medical History:   Diagnosis Date     Central hypothyroidism      Empty sella (H)     partially empty     Hypogonadotropic hypogonadism (H)      Secondary adrenal insufficiency (H)      Patrick syndrome (H) 2001     Current Outpatient Medications   Medication Sig Dispense Refill     cholecalciferol (VITAMIN D3) 1000 UNIT tablet Take 2 tablets (2,000 Units) by mouth daily (Patient taking differently: Take 1,000 Units by mouth daily ) 180 tablet 3     hydrocortisone (CORTEF) 5 MG tablet Take  2 tablets ( 10 mg) in the am and 1 tablet ( 5 mg)  in the afternoon. 400 tablet 3     levothyroxine (SYNTHROID/LEVOTHROID) 125 MCG tablet Take 1 tablet (125 mcg) by mouth daily 90 tablet 3     norethindrone-ethinyl estradiol (MICROGESTIN 1/20) 1-20 MG-MCG tablet Take 1 tablet by mouth daily 90 tablet 3     Personal Hx   Behavioral history: No tobacco use.   Home environment: No secondhand tobacco smoke in home.   ; lives in [place]; works 3 hours/day  Shifts in the [employer] - on her feet the whole time;  is \"working all over\"; 2 daughters, youngest is age 18; doesn't have health insurance.     Family " "history  Family History   Problem Relation Age of Onset     Diabetes Mother      Hypertension Father        Physical Exam   GENERAL: pleasant  young woman in no apparent distress.    /62   Pulse 78   Ht 1.676 m (5' 6\")   Wt 89.8 kg (198 lb)   BMI 31.96 kg/m     SKIN: normal color, temperature   HEENT: PER, no scleral icterus, eyelid retraction, stare, lid lag, proptosis or conjunctival injection.   MOUTH: large tonsils bilaterally  NECK no goiter ; no cervical adenopathy  LUNGS: clear bilaterally   CARDIAC: RRR S1 S2   NEURO: Alert, responds appropriately to questions,moves all extremities, gait normal, no tremor   EXT: no pitting edema     DATA REVIEW:    Results for JHOANA LR (MRN [id number]) as of 7/10/2019 06:46   Ref. Range 1/24/2018 14:08 7/8/2019 14:40   Sodium Latest Ref Range: 133 - 144 mmol/L 135 135   Potassium Latest Ref Range: 3.4 - 5.3 mmol/L 3.6    Creatinine Latest Ref Range: 0.52 - 1.04 mg/dL 0.52    GFR Estimate Latest Ref Range: >60 mL/min/1.7m2 >90    GFR Estimate If Black Latest Ref Range: >60 mL/min/1.7m2 >90    Hemoglobin A1C Latest Ref Range: 0 - 5.6 % 5.9 5.9 (H)   T4 Free Latest Ref Range: 0.76 - 1.46 ng/dL 0.87 1.14   Vitamin D Deficiency screening Latest Ref Range: 20 - 75 ug/L 18 (L) 24   Ins Growth Factor 1 Latest Ref Range: 80 - 277 ng/ml 27 (L)        Again, thank you for allowing me to participate in the care of your patient.      Sincerely,    Lyla Timmons MD      "

## 2019-07-08 NOTE — PROGRESS NOTES
Assessment   Panhypopituitarism   1. Central hypothyroidism.  Treat to high normal Free T4 target.  Labs following the appt are at target.     2. partially empty sella - as per # 1, 3, 4, 5     3. 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 Refille the Rx for OCP    4. secondary adrenal insufficiency -  HC 10/5 dose - Refilled.      5. Low IGF-1 consistent with possible GH deficiency.  - She has deficiency of 3 other pituitary hormones. We have never succeeded in getting her on GH and she now had no health insurance, which will make it even more prohibitive.    Obesity- is a risk for the pre-diabetes.  Recommend lifestyle.     Pre-diabetes based on labs last year; Family history of DM. Labs following appt continue to show pre-diabetes  Recommend lifestyle.     Hypovitaminosis D - she is on half the vitamin D dose I had previously prescribed. Labs following appt show low normal vitamin D.  Vitamin D may continue at 1000 international unit(s)/day.      Health insurance lack.  Refer to .     Lyla Timmons MD.      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  She has had amenorrhea since the birth of her last child in 2001. The delivery had significant associated hemorrhage and required blood transfusion. Subsequently, she did not have another period. She breast fed the baby for a 2-3 weeks at most, was unable to continue.  I started Octavio on therapy in 2008. After our last appt I recommended she start vitamin D 2000 international unit(s)/day.      She was last seen by me 1/18.Today she reports she is  on   HC 10 mg and 5 mg afternoon  LT4 125 mcg/day  OCP- needs refill  We have repeatedly been unsuccessful in getting GH.  She  "reminds me today she has no health insurance.  She is worried about the cost of medication and testing.     Labs:  10/13/18: HgbA1c 5.8%  7/8/19 following appt HgbA1c 5.9%, Na 135, free T4 1.14, [id number]    ROS   Sleep at night usually good  Weight is stable ; bedtime:  8-9 PM; up at 5-6 AM  Cardiac: negative  Respiratory: negative  GI: diarrhea - ; if she forgets the 2nd HC she has abdominal pain and diarrhea - slept on the floor -  Most of the time feels weakness - especially if missing vitamin D  Nocturia 1-2 /night    Past Medical History:   Diagnosis Date     Central hypothyroidism      Empty sella (H)     partially empty     Hypogonadotropic hypogonadism (H)      Secondary adrenal insufficiency (H)      Patrick syndrome (H) 2001     Current Outpatient Medications   Medication Sig Dispense Refill     cholecalciferol (VITAMIN D3) 1000 UNIT tablet Take 2 tablets (2,000 Units) by mouth daily (Patient taking differently: Take 1,000 Units by mouth daily ) 180 tablet 3     hydrocortisone (CORTEF) 5 MG tablet Take  2 tablets ( 10 mg) in the am and 1 tablet ( 5 mg)  in the afternoon. 400 tablet 3     levothyroxine (SYNTHROID/LEVOTHROID) 125 MCG tablet Take 1 tablet (125 mcg) by mouth daily 90 tablet 3     norethindrone-ethinyl estradiol (MICROGESTIN 1/20) 1-20 MG-MCG tablet Take 1 tablet by mouth daily 90 tablet 3     Personal Hx   Behavioral history: No tobacco use.   Home environment: No secondhand tobacco smoke in home.   ; lives in [place]; works 3 hours/day  Shifts in the [employer] - on her feet the whole time;  is \"working all over\"; 2 daughters, youngest is age 18; doesn't have health insurance.     Family history  Family History   Problem Relation Age of Onset     Diabetes Mother      Hypertension Father        Physical Exam   GENERAL: pleasant  young woman in no apparent distress.    /62   Pulse 78   Ht 1.676 m (5' 6\")   Wt 89.8 kg (198 lb)   BMI 31.96 kg/m    SKIN: normal color, " temperature   HEENT: PER, no scleral icterus, eyelid retraction, stare, lid lag, proptosis or conjunctival injection.   MOUTH: large tonsils bilaterally  NECK no goiter ; no cervical adenopathy  LUNGS: clear bilaterally   CARDIAC: RRR S1 S2   NEURO: Alert, responds appropriately to questions,moves all extremities, gait normal, no tremor   EXT: no pitting edema     DATA REVIEW:    Results for IDDJHOANA (MRN [id number]) as of 7/10/2019 06:46   Ref. Range 1/24/2018 14:08 7/8/2019 14:40   Sodium Latest Ref Range: 133 - 144 mmol/L 135 135   Potassium Latest Ref Range: 3.4 - 5.3 mmol/L 3.6    Creatinine Latest Ref Range: 0.52 - 1.04 mg/dL 0.52    GFR Estimate Latest Ref Range: >60 mL/min/1.7m2 >90    GFR Estimate If Black Latest Ref Range: >60 mL/min/1.7m2 >90    Hemoglobin A1C Latest Ref Range: 0 - 5.6 % 5.9 5.9 (H)   T4 Free Latest Ref Range: 0.76 - 1.46 ng/dL 0.87 1.14   Vitamin D Deficiency screening Latest Ref Range: 20 - 75 ug/L 18 (L) 24   Ins Growth Factor 1 Latest Ref Range: 80 - 277 ng/ml 27 (L)

## 2019-07-08 NOTE — PATIENT INSTRUCTIONS
See me about once/year    Vitamin D3 dose is 2000 international unit(s)/day.    [email or web address]    Clinic phone number [phone] - pick the nurse line

## 2019-07-09 DIAGNOSIS — E55.9 HYPOVITAMINOSIS D: ICD-10-CM

## 2019-07-09 LAB — DEPRECATED CALCIDIOL+CALCIFEROL SERPL-MC: 24 UG/L (ref 20–75)

## 2019-11-08 ENCOUNTER — HEALTH MAINTENANCE LETTER (OUTPATIENT)
Age: 39
End: 2019-11-08

## 2020-02-23 ENCOUNTER — HEALTH MAINTENANCE LETTER (OUTPATIENT)
Age: 40
End: 2020-02-23

## 2020-04-29 DIAGNOSIS — E23.0 PANHYPOPITUITARISM (H): ICD-10-CM

## 2020-04-29 RX ORDER — LEVOTHYROXINE SODIUM 125 UG/1
125 TABLET ORAL DAILY
Qty: 90 TABLET | Refills: 4 | Status: SHIPPED | OUTPATIENT
Start: 2020-04-29 | End: 2020-12-29

## 2020-04-29 NOTE — TELEPHONE ENCOUNTER
levothyroxine (SYNTHROID/LEVOTHROID) 125 MCG tablet       Last Written Prescription Date:  4-15-19  Last Fill Quantity: 90,   # refills: 3  Last Office Visit : 7-8-19  Future Office visit:  none    Routing refill request to provider for review/approval because:  Provider preference.             Kathleen M Doege RN

## 2020-05-15 ENCOUNTER — TELEPHONE (OUTPATIENT)
Dept: ENDOCRINOLOGY | Facility: CLINIC | Age: 40
End: 2020-05-15

## 2020-05-15 DIAGNOSIS — R73.03 PRE-DIABETES: ICD-10-CM

## 2020-05-15 DIAGNOSIS — E23.0 PANHYPOPITUITARISM (H): Primary | ICD-10-CM

## 2020-05-15 NOTE — TELEPHONE ENCOUNTER
[facility] Call Center    Phone Message    May a detailed message be left on voicemail: no     Reason for Call:  Pt needing to schedule an appt with Iain but does not want to do telephone appt. I tried to explain due to covid appts are limited and offered telephone. Please call pt back. Pt is also requesting lab work. Thank you     Action Taken: Message routed to:  [facility] ([facility] endo    Travel Screening: Not Applicable

## 2020-05-17 NOTE — TELEPHONE ENCOUNTER
Please call and let patient know virtual is the only option. She will still be able to do labs but needs to have a virtual appt with Dr. Iain mcclellan

## 2020-06-10 NOTE — TELEPHONE ENCOUNTER
We have no plans to see patients in clinic in September.   I suggest she schedule follow up virtually.  Orders placed.  Lyla Timmons MD

## 2020-06-10 NOTE — TELEPHONE ENCOUNTER
CLINIC COORDINATOR SCHEDULING NOTES    CALL RESULT: LVM    APPT TYPE: VIRTUAL VISIT RETURN    PROVIDER: Iain    DATE APPT NEEDED: 1st available    ADDITIONAL NOTES: At this time, we are only scheduling virtual appointments, and this is the only option. If pt ONLY wants in-person visit with no exceptions, it is OK to schedule an in-person visit in September, as this is the first date we are hoping for patients being in clinic. This is subject to change.

## 2020-06-10 NOTE — TELEPHONE ENCOUNTER
Declines virtual visit but asking for lab work to be  ordered. She was notified that September is when  patients will be seen in clinic. I don't see anything scheduled as of yet. If labs are ordered when would you want them done  ? Eva Celestin RN on 6/10/2020 at 1:57 PM  .

## 2020-06-10 NOTE — TELEPHONE ENCOUNTER
Lab orders entered but Dr Timmons said no plans to see patients  Face to face in September. She should schedule a virtual visit soon. NO labs unless scheduled for visit .Eva Celestin RN on 6/10/2020 at 4:51 PM

## 2020-07-17 DIAGNOSIS — E23.0 PANHYPOPITUITARISM (H): ICD-10-CM

## 2020-07-20 RX ORDER — HYDROCORTISONE 5 MG/1
TABLET ORAL
Qty: 270 TABLET | Refills: 3 | Status: SHIPPED | OUTPATIENT
Start: 2020-07-20 | End: 2021-08-09

## 2020-07-20 NOTE — TELEPHONE ENCOUNTER
hydrocortisone (CORTEF) 5 MG tablet    Take  2 tablets ( 10 mg) in the am and 1 tablet ( 5 mg)  in the afternoon.     Last Written Prescription Date:  7/28/19  Last Fill Quantity: 400,   # refills: 3  Last Office Visit : 7/8/19  Future Office visit:  8/7/20    Routing refill request to provider for review/approval because:  Provider's preference.

## 2020-08-05 NOTE — PROGRESS NOTES
"Thyroid  BRENDON Barcenaswsiya P Idd is a 40 year old female who is being evaluated via a billable telephone visit.      The patient has been notified of following:     \"This telephone visit will be conducted via a call between you and your physician/provider. We have found that certain health care needs can be provided without the need for a physical exam.  This service lets us provide the care you need with a short phone conversation.  If a prescription is necessary we can send it directly to your pharmacy.  If lab work is needed we can place an order for that and you can then stop by our lab to have the test done at a later time.    Telephone visits are billed at different rates depending on your insurance coverage. During this emergency period, for some insurers they may be billed the same as an in-person visit.  Please reach out to your insurance provider with any questions.    If during the course of the call the physician/provider feels a telephone visit is not appropriate, you will not be charged for this service.\"    Patient has given verbal consent for Telephone visit?  Yes    What phone number would you like to be contacted at? [phone]    How would you like to obtain your AVS? Mail a copy    "

## 2020-08-06 NOTE — PROGRESS NOTES
Endocrinology video visit clinic note     Assessment   Partial hypopituitarism (minus DI)  1. Central hypothyroidism.  Treat to high normal Free T4 target.     Addendum: labs from 12/23/2020 : Na 138, free T4 1.31, HgbA1c 6%    2. partially empty sella - as per # 1, 3, 4, 5     3. 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 Refille the Rx for OCP    4. secondary adrenal insufficiency -  HC 10/5 dose - Refilled.      5. Low IGF-1 consistent with possible GH deficiency.  - She has deficiency of 3 other pituitary hormones. We have never succeeded in getting her on GH     Obesity- is a risk for the pre-diabetes.  Recommend lifestyle.     Pre-diabetes based on labs last year; Family history of DM. Labs following appt continue to show pre-diabetes  Recommend lifestyle.     Due to the COVID 19 pandemic this visit was a video visit in order to help prevent spread of infection in this high risk patient and the general population. The patient gave verbal consent for the visit today.    Start time 0926 Endra video invite and nudge;   Stop time 0941  Total time   Lyla Timmons MD.      HPI   She does not require .    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  She also has prediabetes, hypovitaminosis D.   She has had amenorrhea since the birth of her last child in 2001. The delivery had significant associated hemorrhage and required blood transfusion. Subsequently, she did not have another period. She breast fed the baby for a 2-3 weeks at most, was unable to continue.  I started Octavio on therapy in 2008.   The record shows she had positive COVID 19 test 6/10/2020. She had fever, cough, headache, loss of taste and smell.  Today she  reports she is a little btetter now , not back to normal - hasn't gone back to work .      She was last seen by me 7/19.Today she reports she is  on   HC 10 mg and 5 mg afternoon  LT4 125 mcg/day  We have repeatedly been unsuccessful in getting GH.     Labs:  10/13/18: HgbA1c 5.8%  7/8/19  HgbA1c 5.9%, Na 135, free T4 1.14, [id number]    ROS   Weight is up; current weight unknown -  Sleep at night is not bad - worse since COVID; wakes up and can't get back to sleep; she had nightmares with COVID -   Cardioc:   Respiratory: sneezing continues intermittently  GI: diarrhea sometimes;   Muscle pain- legs , back ,   Headache- when I get up   Stress  Taste is altered - don't taste sweets - not back to normal.      Past Medical History:   Diagnosis Date     Central hypothyroidism      Empty sella (H)     partially empty     Hypogonadotropic hypogonadism (H)      Secondary adrenal insufficiency (H)      Patrick syndrome (H) 2001     Current Outpatient Medications   Medication Sig Dispense Refill     hydrocortisone (CORTEF) 5 MG tablet Take  2 tablets ( 10 mg) in the am and 1 tablet ( 5 mg)  in the afternoon. 270 tablet 3     levothyroxine (SYNTHROID/LEVOTHROID) 125 MCG tablet Take 1 tablet (125 mcg) by mouth daily 90 tablet 4     norethindrone-ethinyl estradiol (MICROGESTIN 1/20) 1-20 MG-MCG tablet Take 1 tablet by mouth daily 90 tablet 3     vitamin D3 (CHOLECALCIFEROL) 1000 units (25 mcg) tablet Take 1 tablet (1,000 Units) by mouth daily 200 tablet 1     Personal Hx   Behavioral history: No tobacco use.   Home environment: No secondhand tobacco smoke in home.   ; lives in [place]; off work from [email or web address];     Family history  Family History   Problem Relation Age of Onset     Diabetes Mother      Hypertension Father      Physical Exam   GENERAL: pleasant  young woman in no apparent distress.    There were no vitals taken for this visit.   GENERAL: no distress  SKIN: Visible skin clear.  EYES: Eyes grossly  normal to inspection.    RESP: Shee sneezed 3 times during out appointment.  No audible wheeze, cough, or visible cyanosis.  No visible retractions or increased work of breathing.    NEURO: Awake, alert, responds appropriately to questions.  Mentation and speech fluent.  PSYCH:affect normal, judgement and insight intact, and appearance well-groomed.    DATA REVIEW:     Ref. Range 1/24/2018 14:08 7/8/2019 14:40   Sodium Latest Ref Range: 133 - 144 mmol/L 135 135   Potassium Latest Ref Range: 3.4 - 5.3 mmol/L 3.6    Creatinine Latest Ref Range: 0.52 - 1.04 mg/dL 0.52    GFR Estimate Latest Ref Range: >60 mL/min/1.7m2 >90    GFR Estimate If Black Latest Ref Range: >60 mL/min/1.7m2 >90    Hemoglobin A1C Latest Ref Range: 0 - 5.6 % 5.9 5.9 (H)   T4 Free Latest Ref Range: 0.76 - 1.46 ng/dL 0.87 1.14   Vitamin D Deficiency screening Latest Ref Range: 20 - 75 ug/L 18 (L) 24   Ins Growth Factor 1 Latest Ref Range: 80 - 277 ng/ml 27 (L)

## 2020-08-07 ENCOUNTER — VIRTUAL VISIT (OUTPATIENT)
Dept: ENDOCRINOLOGY | Facility: CLINIC | Age: 40
End: 2020-08-07

## 2020-08-07 DIAGNOSIS — E66.9 OBESITY, UNSPECIFIED OBESITY SEVERITY, UNSPECIFIED OBESITY TYPE: ICD-10-CM

## 2020-08-07 DIAGNOSIS — E23.0 PARTIAL HYPOPITUITARISM (H): ICD-10-CM

## 2020-08-07 DIAGNOSIS — R73.03 PRE-DIABETES: Primary | ICD-10-CM

## 2020-08-07 DIAGNOSIS — E23.0 PANHYPOPITUITARISM (H): ICD-10-CM

## 2020-08-07 DIAGNOSIS — E03.8 CENTRAL HYPOTHYROIDISM: ICD-10-CM

## 2020-08-07 DIAGNOSIS — E27.49 SECONDARY ADRENAL INSUFFICIENCY (H): ICD-10-CM

## 2020-08-07 PROBLEM — E27.40 ADRENAL INSUFFICIENCY (H): Status: ACTIVE | Noted: 2020-08-07

## 2020-08-07 RX ORDER — NORETHINDRONE ACETATE AND ETHINYL ESTRADIOL .02; 1 MG/1; MG/1
1 TABLET ORAL DAILY
Qty: 90 TABLET | Refills: 3 | Status: SHIPPED | OUTPATIENT
Start: 2020-08-07 | End: 2021-08-09

## 2020-08-07 NOTE — PATIENT INSTRUCTIONS
Get labs at any [facility] laboratory  My staff will reach out to  You to help schedule (or you can call [phone] to schedule lab draw)

## 2020-08-07 NOTE — LETTER
"8/7/2020       RE: Octavio Jesus  [address] [place] [id number]     Dear Colleague,    Thank you for referring your patient, Octavio Jessu, to the [facility] ENDOCRINOLOGY at [facility]. Please see a copy of my visit note below.    Thyroid  Kristine Xiong CMA    Octavio Jesus is a 40 year old female who is being evaluated via a billable telephone visit.      The patient has been notified of following:     \"This telephone visit will be conducted via a call between you and your physician/provider. We have found that certain health care needs can be provided without the need for a physical exam.  This service lets us provide the care you need with a short phone conversation.  If a prescription is necessary we can send it directly to your pharmacy.  If lab work is needed we can place an order for that and you can then stop by our lab to have the test done at a later time.    Telephone visits are billed at different rates depending on your insurance coverage. During this emergency period, for some insurers they may be billed the same as an in-person visit.  Please reach out to your insurance provider with any questions.    If during the course of the call the physician/provider feels a telephone visit is not appropriate, you will not be charged for this service.\"    Patient has given verbal consent for Telephone visit?  Yes    What phone number would you like to be contacted at? [phone]    How would you like to obtain your AVS? Mail a copy      Endocrinology video visit clinic note     Assessment   Partial hypopituitarism (minus DI)  1. Central hypothyroidism.  Treat to high normal Free T4 target.  Labs following the appt are at target.     2. partially empty sella - as per # 1, 3, 4, 5     3. 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 Refille the Rx for OCP    4. secondary adrenal insufficiency -  HC 10/5 dose - " Refilled.      5. Low IGF-1 consistent with possible GH deficiency.  - She has deficiency of 3 other pituitary hormones. We have never succeeded in getting her on GH     Obesity- is a risk for the pre-diabetes.  Recommend lifestyle.     Pre-diabetes based on labs last year; Family history of DM. Labs following appt continue to show pre-diabetes  Recommend lifestyle.     Due to the COVID 19 pandemic this visit was a video visit in order to help prevent spread of infection in this high risk patient and the general population. The patient gave verbal consent for the visit today.    Start time 0926 [employer] video invite and nudge;   Stop time 0941  Total time   Lyla Timmons MD.      HPI   She does not require .    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  She also has prediabetes, hypovitaminosis D.   She has had amenorrhea since the birth of her last child in 2001. The delivery had significant associated hemorrhage and required blood transfusion. Subsequently, she did not have another period. She breast fed the baby for a 2-3 weeks at most, was unable to continue.  I started Octavio on therapy in 2008.   The record shows she had positive COVID 19 test 6/10/2020. She had fever, cough, headache, loss of taste and smell.  Today she reports she is a little btetter now , not back to normal - hasn't gone back to work .      She was last seen by me 7/19.Today she reports she is  on   HC 10 mg and 5 mg afternoon  LT4 125 mcg/day  We have repeatedly been unsuccessful in getting GH.     Labs:  10/13/18: HgbA1c 5.8%  7/8/19  HgbA1c 5.9%, Na 135, free T4 1.14, [id number]    ROS   Weight is up; current weight unknown -  Sleep at night is not bad - worse since COVID; wakes up and can't get back to  sleep; she had nightmares with COVID -   Cardioc:   Respiratory: sneezing continues intermittently  GI: diarrhea sometimes;   Muscle pain- legs , back ,   Headache- when I get up   Stress  Taste is altered - don't taste sweets - not back to normal.      Past Medical History:   Diagnosis Date     Central hypothyroidism      Empty sella (H)     partially empty     Hypogonadotropic hypogonadism (H)      Secondary adrenal insufficiency (H)      Patrick syndrome (H) 2001     Current Outpatient Medications   Medication Sig Dispense Refill     hydrocortisone (CORTEF) 5 MG tablet Take  2 tablets ( 10 mg) in the am and 1 tablet ( 5 mg)  in the afternoon. 270 tablet 3     levothyroxine (SYNTHROID/LEVOTHROID) 125 MCG tablet Take 1 tablet (125 mcg) by mouth daily 90 tablet 4     norethindrone-ethinyl estradiol (MICROGESTIN 1/20) 1-20 MG-MCG tablet Take 1 tablet by mouth daily 90 tablet 3     vitamin D3 (CHOLECALCIFEROL) 1000 units (25 mcg) tablet Take 1 tablet (1,000 Units) by mouth daily 200 tablet 1     Personal Hx   Behavioral history: No tobacco use.   Home environment: No secondhand tobacco smoke in home.   ; lives in [place]; off work from [employer];     Family history  Family History   Problem Relation Age of Onset     Diabetes Mother      Hypertension Father      Physical Exam   GENERAL: pleasant  young woman in no apparent distress.    There were no vitals taken for this visit.   GENERAL: no distress  SKIN: Visible skin clear.  EYES: Eyes grossly normal to inspection.    RESP: Shee sneezed 3 times during out appointment.  No audible wheeze, cough, or visible cyanosis.  No visible retractions or increased work of breathing.    NEURO: Awake, alert, responds appropriately to questions.  Mentation and speech fluent.  PSYCH:affect normal, judgement and insight intact, and appearance well-groomed.    DATA REVIEW:     Ref. Range 1/24/2018 14:08 7/8/2019 14:40   Sodium Latest Ref Range: 133 - 144 mmol/L 135 135    Potassium Latest Ref Range: 3.4 - 5.3 mmol/L 3.6    Creatinine Latest Ref Range: 0.52 - 1.04 mg/dL 0.52    GFR Estimate Latest Ref Range: >60 mL/min/1.7m2 >90    GFR Estimate If Black Latest Ref Range: >60 mL/min/1.7m2 >90    Hemoglobin A1C Latest Ref Range: 0 - 5.6 % 5.9 5.9 (H)   T4 Free Latest Ref Range: 0.76 - 1.46 ng/dL 0.87 1.14   Vitamin D Deficiency screening Latest Ref Range: 20 - 75 ug/L 18 (L) 24   Ins Growth Factor 1 Latest Ref Range: 80 - 277 ng/ml 27 (L)        Again, thank you for allowing me to participate in the care of your patient.      Sincerely,    Lyla Timmons MD

## 2020-10-06 ENCOUNTER — TELEPHONE (OUTPATIENT)
Dept: ENDOCRINOLOGY | Facility: CLINIC | Age: 40
End: 2020-10-06

## 2020-10-06 NOTE — TELEPHONE ENCOUNTER
[facility] Call Center    Phone Message    May a detailed message be left on voicemail: yes     Reason for Call: Other: Pt states they received their lab results but would like to know what their next steps should be.  Please follow up with the Pt.  Thank you.     Action Taken: Message routed to:  [facility]    Travel Screening: Not Applicable

## 2020-11-10 NOTE — TELEPHONE ENCOUNTER
Patient had labs completed at [facility] so  labs were not complete patient. I offered to fax lab orders to [facility] but patient states she will go to the [facility] lab in [place].

## 2020-11-10 NOTE — TELEPHONE ENCOUNTER
[facility] Call Center    Phone Message    May a detailed message be left on voicemail: yes     Reason for Call: Other: Pt called looking to speak to a nurse in regards to next steps. Pt stated she got labs done a month ago in [place] and pt confirmed she got the results. Please follow up with pt.     Action Taken: Message routed to:  [facility] Endo    Travel Screening: Not Applicable

## 2020-12-06 ENCOUNTER — HEALTH MAINTENANCE LETTER (OUTPATIENT)
Age: 40
End: 2020-12-06

## 2020-12-23 DIAGNOSIS — R73.03 PRE-DIABETES: ICD-10-CM

## 2020-12-23 DIAGNOSIS — E23.0 PARTIAL HYPOPITUITARISM (H): ICD-10-CM

## 2020-12-23 LAB — HBA1C MFR BLD: 6 % (ref 0–5.6)

## 2020-12-23 PROCEDURE — 83036 HEMOGLOBIN GLYCOSYLATED A1C: CPT

## 2020-12-23 PROCEDURE — 84439 ASSAY OF FREE THYROXINE: CPT

## 2020-12-23 PROCEDURE — 84295 ASSAY OF SERUM SODIUM: CPT

## 2020-12-23 PROCEDURE — 36415 COLL VENOUS BLD VENIPUNCTURE: CPT

## 2020-12-24 LAB
SODIUM SERPL-SCNC: 138 MMOL/L (ref 133–144)
T4 FREE SERPL-MCNC: 1.31 NG/DL (ref 0.76–1.46)

## 2020-12-29 ENCOUNTER — TELEPHONE (OUTPATIENT)
Dept: ENDOCRINOLOGY | Facility: CLINIC | Age: 40
End: 2020-12-29

## 2020-12-29 RX ORDER — LEVOTHYROXINE SODIUM 125 UG/1
125 TABLET ORAL DAILY
Qty: 90 TABLET | Refills: 4 | Status: SHIPPED | OUTPATIENT
Start: 2020-12-29 | End: 2022-01-12

## 2020-12-29 NOTE — TELEPHONE ENCOUNTER
[facility] Call Center    Phone Message    May a detailed message be left on voicemail: yes     Reason for Call: Requesting Results   Name/type of test: lab   Date of test: 12/23/20  Was test done at a location other than [facility] (Please fill in the location if not [facility])?: No      Action Taken: Message routed to:  [facility] endo    Travel Screening: Not Applicable

## 2020-12-30 NOTE — TELEPHONE ENCOUNTER
Per Patient is calling back wanting to get another call back in regards to getting the results of the lab. Patient states wanting to have more detail and information on the results, please advise.

## 2020-12-30 NOTE — TELEPHONE ENCOUNTER
Patient notified about normal lab results and the presents of prediabetes she will be scheduling a follow up for next year in August.

## 2021-06-25 DIAGNOSIS — E23.0 PANHYPOPITUITARISM (H): ICD-10-CM

## 2021-06-28 RX ORDER — LEVOTHYROXINE SODIUM 125 UG/1
TABLET ORAL
Qty: 90 TABLET | Refills: 4 | OUTPATIENT
Start: 2021-06-28

## 2021-08-06 RX ORDER — UBIDECARENONE 75 MG
100 CAPSULE ORAL DAILY
COMMUNITY
End: 2023-10-18

## 2021-08-06 NOTE — PROGRESS NOTES
Octavio MARTINEZ Idd  is being evaluated via a billable video visit.      How would you like to obtain your AVS? [facility]     For the video visit, send the invitation by: Text to cell phone: [phone]     Will anyone else be joining your video visit? Ignacia HALL MA

## 2021-08-09 ENCOUNTER — VIRTUAL VISIT (OUTPATIENT)
Dept: ENDOCRINOLOGY | Facility: CLINIC | Age: 41
End: 2021-08-09

## 2021-08-09 ENCOUNTER — TELEPHONE (OUTPATIENT)
Dept: ENDOCRINOLOGY | Facility: CLINIC | Age: 41
End: 2021-08-09

## 2021-08-09 DIAGNOSIS — E23.6 EMPTY SELLA (H): ICD-10-CM

## 2021-08-09 DIAGNOSIS — R51.9 NONINTRACTABLE HEADACHE, UNSPECIFIED CHRONICITY PATTERN, UNSPECIFIED HEADACHE TYPE: ICD-10-CM

## 2021-08-09 DIAGNOSIS — E23.0 PARTIAL HYPOPITUITARISM (H): ICD-10-CM

## 2021-08-09 DIAGNOSIS — R73.03 PRE-DIABETES: Primary | ICD-10-CM

## 2021-08-09 DIAGNOSIS — E27.49 SECONDARY ADRENAL INSUFFICIENCY (H): ICD-10-CM

## 2021-08-09 DIAGNOSIS — E03.8 CENTRAL HYPOTHYROIDISM: ICD-10-CM

## 2021-08-09 PROCEDURE — 99215 OFFICE O/P EST HI 40 MIN: CPT | Mod: 95

## 2021-08-09 RX ORDER — HYDROCORTISONE 5 MG/1
TABLET ORAL
Qty: 270 TABLET | Refills: 4 | Status: SHIPPED | OUTPATIENT
Start: 2021-08-09 | End: 2022-08-11

## 2021-08-09 RX ORDER — NORETHINDRONE ACETATE AND ETHINYL ESTRADIOL .02; 1 MG/1; MG/1
1 TABLET ORAL DAILY
Qty: 90 TABLET | Refills: 4 | Status: SHIPPED | OUTPATIENT
Start: 2021-08-09 | End: 2023-10-18

## 2021-08-09 NOTE — LETTER
8/9/2021       RE: Octavio MARTINEZ Idd  [address]  [address]  [place] [address]     Dear Colleague,    Thank you for referring your patient, Octavio MARTINEZ Idgregorio, to the [facility] ENDOCRINOLOGY CLINIC [place] [facility]. Please see a copy of my visit note below.    Octavio Jesus  is being evaluated via a billable video visit.      How would you like to obtain your AVS? MyChart     For the video visit, send the invitation by: Text to cell phone: [phone]     Will anyone else be joining your video visit? No    Zee HALL MA        Endocrinology video visit clinic note     Assessment   Partial hypopituitarism (minus DI)  1. Central hypothyroidism.  Treat to high normal Free T4 target. On LT4 125 mcg/day.  Labs normal 12/2020.      2. partially empty sella - as per # 1, 3, 4, 5 now with increased HAs  Repeat pituitary /brain imaging    3. 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 Refilled the Rx for OCP    4. secondary adrenal insufficiency -  HC 10/5 dose - Refilled.      5. Low IGF-1 consistent with possible GH deficiency.  - She has deficiency of 3 other pituitary hormones. We have never succeeded in getting her on GH     Obesity- is a risk for the pre-diabetes.  Recommend lifestyle.     Pre-diabetes ; Family history of DM.  Labs HgbA1c  Recommend lifestyle.     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     Fatigue is most likely due to the night shift work.     Headaches have become a concern resulting in ED visit, associated with paroxysmal HTN.  Repeat pituitary /brain imaging    Addendum:   Labs 8/10/2021 HgbA1c 5.8%, Na 140, free T4 0.99, 25OHD 27    Due to the COVID 19 pandemic this visit was a video visit. The patient gave verbal consent for the visit today.    I have independently reviewed and interpreted labs, imaging as indicated.     Chart  review/prep time 1  [id number]   Visit Start time 1300 Amwel link txt and email sent;   Visit Stop time 1320  _45_ minutes spent on the date of the encounter doing chart review, history and exam, documentation and further activities as noted above.  Lyla Timmons MD    HPI   She does not require .    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  She also has prediabetes, hypovitaminosis D.   She has had amenorrhea since the birth of her last child in 2001. The delivery had significant associated hemorrhage and required blood transfusion. Subsequently, she did not have another period. She breast fed the baby for a 2-3 weeks at most, was unable to continue.  I started Octavio on therapy in 2008.     She was last seen by me 8/2020.  She last had relevant labs 12/2020.  Today she reports she is  on   HC 10 mg and 5 mg afternoon  LT4 125 mcg/day  We have repeatedly been unsuccessful in getting GH.     She was seen in ED [facility] on 6/20/2021 with headache. Vitals at the time included /79, HR 72/minute, weight 195, BMI 31.47. The note states she reported /113.  Imaging was not performed.  She was treated with Zyprexa.  Today she repots she is not on BP medication.  She has a BP machine and she is monitoring her BP.  Her BP recentlly is 115/65.  She reports she is not doing well. She has multiple symptoms as noted in the ROS.      Labs:  10/13/18: HgbA1c 5.8%  7/8/19  HgbA1c 5.9%, Na 135, free T4 1.14, [id number]  12/23/2020 : Na 138, free T4 1.31, HgbA1c 6%    Weights  5/1/12 215# BMI 35.13  5/8/15 213#  3/24/17 197#   7/8/19 198#, BMI 31.96, height 66 inches    ROS   Tired/ fatigue all the time for the last few weeks; bedtime: 9 or 10 PM;   Don't sleep well (this  is a change); sleep interrupted by nocturia; hard to fall asleep;   Taste isn't normal;   Heat and cold intolerance   Irritated easily /getting mad quick.  Salvatorear alot  Headaches associated with high BP; had HA today; headaches are often now  Can't do what I used to do anymore  Work 2 PM   2 jobs; one is easier; the job requiring lifting starts 2 PM until 6 or 630 PM or 7 PM   X 5 days;  Another shift 3 AM to 7 or 8 AM two nights /week;  She has been doing this extra shift again x 3 weeks  Knee hurts - got excuse from her doctor for this; knee bothers with walking;   Weight loss months ago - current weight 192 yesterday.    She is off the oCP now.  She has been off x months.      Past Medical History:   Diagnosis Date     Central hypothyroidism      Empty sella (H)     partially empty     Hypogonadotropic hypogonadism (H)      Secondary adrenal insufficiency (H)      Patrick syndrome (H) 2001     Current Outpatient Medications   Medication Sig Dispense Refill     cyanocobalamin (VITAMIN B-12) 100 MCG tablet Take 100 mcg by mouth daily       hydrocortisone (CORTEF) 5 MG tablet Take  2 tablets ( 10 mg) in the am and 1 tablet ( 5 mg)  in the afternoon. 270 tablet 3     levothyroxine (SYNTHROID/LEVOTHROID) 125 MCG tablet Take 1 tablet (125 mcg) by mouth daily 90 tablet 4     norethindrone-ethinyl estradiol (MICROGESTIN 1/20) 1-20 MG-MCG tablet Take 1 tablet by mouth daily 90 tablet 3     vitamin D3 (CHOLECALCIFEROL) 1000 units (25 mcg) tablet Take 1 tablet (1,000 Units) by mouth daily 200 tablet 1     Vitamin D 25 mcg one/day  B12 100 mcg/day    Personal Hx   Behavioral history: No tobacco use.   Home environment: No secondhand tobacco smoke in home.   ; hard time working hours.  Asking for excuse letter     Family history  Family History   Problem Relation Age of Onset     Diabetes Mother      Hypertension Father      Physical Exam   GENERAL: pleasant  young woman in no apparent distress.  She does not look  cushingoid  There were no vitals taken for this visit.    GENERAL: no distress  SKIN: Visible skin clear.  HEENT: Eyes grossly normal to inspection.    braces  RESP:  No audible wheeze, cough, or visible cyanosis.  No visible retractions or increased work of breathing.    NEURO: Awake, alert, responds appropriately to questions.  Mentation and speech fluent.  PSYCH:affect normal, , and appearance well-groomed.  DATA REVIEW:    Results for IDDJHOANA (MRN [id number]) as of 8/9/2021 15:53   Ref. Range 12/23/2020 12:41   Sodium Latest Ref Range: 133 - 144 mmol/L 138   Hemoglobin A1C Latest Ref Range: 0 - 5.6 % 6.0 (H)   T4 Free Latest Ref Range: 0.76 - 1.46 ng/dL 1.31

## 2021-08-09 NOTE — PROGRESS NOTES
Endocrinology video visit clinic note     Assessment   Partial hypopituitarism (minus DI)  1. Central hypothyroidism.  Treat to high normal Free T4 target. On LT4 125 mcg/day.  Labs normal 12/2020.      2. partially empty sella - as per # 1, 3, 4, 5 now with increased HAs  Repeat pituitary /brain imaging    3. 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 Refilled the Rx for OCP    4. secondary adrenal insufficiency -  HC 10/5 dose - Refilled.      5. Low IGF-1 consistent with possible GH deficiency.  - She has deficiency of 3 other pituitary hormones. We have never succeeded in getting her on GH     Obesity- is a risk for the pre-diabetes.  Recommend lifestyle.     Pre-diabetes ; Family history of DM.  Labs HgbA1c  Recommend lifestyle.     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     Fatigue is most likely due to the night shift work.     Headaches have become a concern resulting in ED visit, associated with paroxysmal HTN.  Repeat pituitary /brain imaging    Addendum:   Labs 8/10/2021 HgbA1c 5.8%, Na 140, free T4 0.99, 25OHD 27    Due to the COVID 19 pandemic this visit was a video visit. The patient gave verbal consent for the visit today.    I have independently reviewed and interpreted labs, imaging as indicated.     Chart review/prep time 1  [id number]   Visit Start time 1300 Amwel link txt and email sent;   Visit Stop time 1320  _45_ minutes spent on the date of the encounter doing chart review, history and exam, documentation and further activities as noted above.  Lyla Timmons MD    HPI   She does not require .    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  She also has  prediabetes, hypovitaminosis D.   She has had amenorrhea since the birth of her last child in 2001. The delivery had significant associated hemorrhage and required blood transfusion. Subsequently, she did not have another period. She breast fed the baby for a 2-3 weeks at most, was unable to continue.  I started Octavio on therapy in 2008.     She was last seen by me 8/2020.  She last had relevant labs 12/2020.  Today she reports she is  on   HC 10 mg and 5 mg afternoon  LT4 125 mcg/day  We have repeatedly been unsuccessful in getting GH.     She was seen in ED [facility] on 6/20/2021 with headache. Vitals at the time included /79, HR 72/minute, weight 195, BMI 31.47. The note states she reported /113.  Imaging was not performed.  She was treated with Zyprexa.  Today she repots she is not on BP medication.  She has a BP machine and she is monitoring her BP.  Her BP recentlly is 115/65.  She reports she is not doing well. She has multiple symptoms as noted in the ROS.      Labs:  10/13/18: HgbA1c 5.8%  7/8/19  HgbA1c 5.9%, Na 135, free T4 1.14, [id number]  12/23/2020 : Na 138, free T4 1.31, HgbA1c 6%    Weights  5/1/12 215# BMI 35.13  5/8/15 213#  3/24/17 197#   7/8/19 198#, BMI 31.96, height 66 inches    ROS   Tired/ fatigue all the time for the last few weeks; bedtime: 9 or 10 PM;   Don't sleep well (this is a change); sleep interrupted by nocturia; hard to fall asleep;   Taste isn't normal;   Heat and cold intolerance   Irritated easily /getting mad quick.  Aneta alot  Headaches associated with high BP; had HA today; headaches are often now  Can't do what I used to do anymore  Work 2 PM   2 jobs; one is easier; the job requiring lifting starts 2 PM until 6 or 630 PM or 7 PM   X 5 days;  Another shift 3 AM to 7 or 8 AM two nights /week;  She has been doing this extra shift again x 3 weeks  Knee hurts - got excuse from her doctor for this; knee bothers with walking;   Weight loss months  ago - current weight 192 yesterday.    She is off the oCP now.  She has been off x months.      Past Medical History:   Diagnosis Date     Central hypothyroidism      Empty sella (H)     partially empty     Hypogonadotropic hypogonadism (H)      Secondary adrenal insufficiency (H)      Patrick syndrome (H) 2001     Current Outpatient Medications   Medication Sig Dispense Refill     cyanocobalamin (VITAMIN B-12) 100 MCG tablet Take 100 mcg by mouth daily       hydrocortisone (CORTEF) 5 MG tablet Take  2 tablets ( 10 mg) in the am and 1 tablet ( 5 mg)  in the afternoon. 270 tablet 3     levothyroxine (SYNTHROID/LEVOTHROID) 125 MCG tablet Take 1 tablet (125 mcg) by mouth daily 90 tablet 4     norethindrone-ethinyl estradiol (MICROGESTIN 1/20) 1-20 MG-MCG tablet Take 1 tablet by mouth daily 90 tablet 3     vitamin D3 (CHOLECALCIFEROL) 1000 units (25 mcg) tablet Take 1 tablet (1,000 Units) by mouth daily 200 tablet 1     Vitamin D 25 mcg one/day  B12 100 mcg/day    Personal Hx   Behavioral history: No tobacco use.   Home environment: No secondhand tobacco smoke in home.   ; hard time working hours.  Asking for excuse letter     Family history  Family History   Problem Relation Age of Onset     Diabetes Mother      Hypertension Father      Physical Exam   GENERAL: pleasant  young woman in no apparent distress.  She does not look cushingoid  There were no vitals taken for this visit.    GENERAL: no distress  SKIN: Visible skin clear.  HEENT: Eyes grossly normal to inspection.    braces  RESP:  No audible wheeze, cough, or visible cyanosis.  No visible retractions or increased work of breathing.    NEURO: Awake, alert, responds appropriately to questions.  Mentation and speech fluent.  PSYCH:affect normal, , and appearance well-groomed.    DATA REVIEW:    Results for JHOANA LR (MRN [id number]) as of 8/9/2021 15:53   Ref. Range 12/23/2020 12:41   Sodium Latest Ref Range: 133 - 144 mmol/L 138   Hemoglobin A1C  Latest Ref Range: 0 - 5.6 % 6.0 (H)   T4 Free Latest Ref Range: 0.76 - 1.46 ng/dL 1.31

## 2021-08-09 NOTE — LETTER
Patient:  Octavio Jesus  :   1980      Octavio Jesus  [address]    [place] [address]          2021    To whom it may concern:    Octavio Jesus is under my care for her condition. We request that she not be required to work the night shift beginning at 3AM but rather that her work hours be restricted to daytime or early evening hours .      Thank you for consideration of this request which we believe will improve her health and well being.      Sincerely,      Lyla Timmons MD      Carbon copy:   Ms.Fowsiya MICHELLE Jesus  [address]    [place] [address]

## 2021-08-09 NOTE — PATIENT INSTRUCTIONS
Yearly labs (standing orders)    See us approximately yearly      Work on lifestyle to delay/prevent onset of diabetes     Brain MRI ([id number] to schedule)

## 2021-08-10 ENCOUNTER — LAB (OUTPATIENT)
Dept: LAB | Facility: CLINIC | Age: 41
End: 2021-08-10

## 2021-08-10 DIAGNOSIS — E27.49 SECONDARY ADRENAL INSUFFICIENCY (H): ICD-10-CM

## 2021-08-10 DIAGNOSIS — R73.03 PRE-DIABETES: ICD-10-CM

## 2021-08-10 DIAGNOSIS — E23.0 PARTIAL HYPOPITUITARISM (H): ICD-10-CM

## 2021-08-10 DIAGNOSIS — E03.8 CENTRAL HYPOTHYROIDISM: ICD-10-CM

## 2021-08-10 LAB — HBA1C MFR BLD: 5.8 % (ref 0–5.6)

## 2021-08-10 PROCEDURE — 83036 HEMOGLOBIN GLYCOSYLATED A1C: CPT

## 2021-08-10 PROCEDURE — 36415 COLL VENOUS BLD VENIPUNCTURE: CPT

## 2021-08-10 PROCEDURE — 82306 VITAMIN D 25 HYDROXY: CPT

## 2021-08-10 PROCEDURE — 84295 ASSAY OF SERUM SODIUM: CPT

## 2021-08-10 PROCEDURE — 84439 ASSAY OF FREE THYROXINE: CPT

## 2021-08-11 LAB
DEPRECATED CALCIDIOL+CALCIFEROL SERPL-MC: 27 UG/L (ref 20–75)
SODIUM SERPL-SCNC: 140 MMOL/L (ref 133–144)
T4 FREE SERPL-MCNC: 0.99 NG/DL (ref 0.76–1.46)

## 2021-08-17 ENCOUNTER — TELEPHONE (OUTPATIENT)
Dept: ENDOCRINOLOGY | Facility: CLINIC | Age: 41
End: 2021-08-17

## 2021-08-17 NOTE — TELEPHONE ENCOUNTER
[facility]    Phone Message    May a detailed message be left on voicemail: yes     Reason for Call: Requesting Results   Name/type of test: various labs  Date of test: 8/9/21  Was test done at a location other than [facility] (Please fill in the location if not [facility])?: no    Pt would like a call back from nurse to discuss lab results.  Please call.    Action Taken: Message routed to:  [facility]    Travel Screening: Not Applicable

## 2021-09-25 ENCOUNTER — HEALTH MAINTENANCE LETTER (OUTPATIENT)
Age: 41
End: 2021-09-25

## 2022-01-09 DIAGNOSIS — E23.0 PANHYPOPITUITARISM (H): ICD-10-CM

## 2022-01-12 RX ORDER — LEVOTHYROXINE SODIUM 125 UG/1
TABLET ORAL
Qty: 90 TABLET | Refills: 4 | Status: SHIPPED | OUTPATIENT
Start: 2022-01-12 | End: 2023-10-18

## 2022-01-12 NOTE — TELEPHONE ENCOUNTER
levothyroxine (SYNTHROID/LEVOTHROID) 125 MCG tablet  Last Written Prescription Date:  12/29/20  Last Fill Quantity: 90,   # refills: 4  Last Office Visit : 8/9/21  Future Office visit: none    Routing refill request to provider for review/approval because:provider preference

## 2022-01-15 ENCOUNTER — HEALTH MAINTENANCE LETTER (OUTPATIENT)
Age: 42
End: 2022-01-15

## 2022-05-16 ENCOUNTER — NURSE TRIAGE (OUTPATIENT)
Dept: NURSING | Facility: CLINIC | Age: 42
End: 2022-05-16

## 2022-05-16 NOTE — TELEPHONE ENCOUNTER
"Octavio reports intermittent recurrent Lt chest pain for the past week.  - Described as heaviness  - Continual \"discomfort\" with intermittent pain   - Harder to take a deep breath  - Feels like \"something stuck\" in Lt chest  - Also has pain to Lt neck, behind her ear  - Denies increasing frequency or severity    911 advised  ER advised    Octavio was calling today to make a clinic appointment. She reported that she was concerned that her symptoms were related to her heart.    She also wanted to know if she could be seen in [facility] or a same day clinic appointment. She does not have a PCP    Advised that UCC would not be appropriate for possible Cardiac problems  She has not established care with a PCP    She is reluctant to call 911 or go to ER    She wanted to be connected to  to set an appointment to establish care. This was offered to her with reinforcing protocol advice    After discussion, she will call a friend to transport her to ER  She will call back later to set an appointment to establish care with a PCP    COVID 19 Nurse Triage Plan/Patient Instructions    Please be aware that novel coronavirus (COVID-19) may be circulating in the community. If you develop symptoms such as fever, cough, or SOB or if you have concerns about the presence of another infection including coronavirus (COVID-19), please contact your health care provider or visit [email or web address].     Disposition/Instructions    ED Visit recommended. Follow protocol based instructions.     Bring Your Own Device:  Please also bring your smart device(s) (smart phones, tablets, laptops) and their charging cables for your personal use and to communicate with your care team during your visit.  Call to EMS/911 recommended. Follow protocol based instructions.     Bring Your Own Device:  Please also bring your smart device(s) (smart phones, tablets, laptops) and their charging cables for your personal use and to communicate with your " care team during your visit.    Thank you for taking steps to prevent the spread of this virus.  o Limit your contact with others.  o Wear a simple mask to cover your cough.  o Wash your hands well and often.    Resources    [facility]: About COVID-19: [email or web address]    CDC: What to Do If You're Sick: www.cdc.gov/coronavirus/2019-ncov/about/steps-when-sick.html    CDC: Ending Home Isolation: www.cdc.gov/coronavirus/2019-ncov/hcp/disposition-in-home-patients.html     CDC: Caring for Someone: www.cdc.gov/coronavirus/2019-ncov/if-you-are-sick/care-for-someone.html     [facility]: Interim Guidance for Hospital Discharge to Home: [email or web address]/hcp/hospdischarge.pdf    [facility] clinical trials (COVID-19 research studies): [email or web address]     Below are the COVID-19 hotlines at the [place] [facility] ([facility]). Interpreters are available.   o For health questions: Call 035-718-9345 or 1-936.678.3505 (7 a.m. to 7 p.m.)  o For questions about schools and childcare: Call 419-652-7723 or 1-688.148.9264 (7 a.m. to 7 p.m.)     Radha Oakley RN  [facility]      Reason for Disposition    [1] Chest pain lasts > 5 minutes AND [2] described as crushing, pressure-like, or heavy    Pain also in shoulder(s) or arm(s) or jaw (Exception: pain is clearly made worse by movement)    Additional Information    Negative: SEVERE difficulty breathing (e.g., struggling for each breath, speaks in single words)    Negative: Difficult to awaken or acting confused (e.g., disoriented, slurred speech)    Negative: Shock suspected (e.g., cold/pale/clammy skin, too weak to stand, low BP, rapid pulse)    Negative: Passed out (i.e., fainted, collapsed and was not responding)    Negative: [1] Chest pain lasts > 5 minutes AND [2] age > 44    Negative: [1] Chest pain lasts > 5 minutes AND [2] history of heart disease (i.e., angina, heart  "attack, heart failure, bypass surgery, takes nitroglycerin)    Negative: [1] Chest pain lasts > 5 minutes AND [2] age > 30 AND [3] one or more cardiac risk factors (e.g., diabetes, high blood pressure, high cholesterol, smoker, or strong family history of heart disease)    Negative: Heart beating < 50 beats per minute OR > 140 beats per minute    Negative: Visible sweat on face or sweat dripping down face    Negative: Sounds like a life-threatening emergency to the triager    Negative: SEVERE chest pain    Negative: [1] Chest pain (or \"angina\") comes and goes AND [2] is happening more often (increasing in frequency) or getting worse (increasing in severity) (Exception: chest pains that last only a few seconds)    Protocols used: CHEST PAIN-A-AH      "

## 2022-08-04 DIAGNOSIS — E27.49 SECONDARY ADRENAL INSUFFICIENCY (H): Primary | ICD-10-CM

## 2022-08-11 RX ORDER — HYDROCORTISONE 5 MG/1
TABLET ORAL
Qty: 270 TABLET | Refills: 4 | Status: SHIPPED | OUTPATIENT
Start: 2022-08-11 | End: 2023-09-13

## 2022-08-11 NOTE — TELEPHONE ENCOUNTER
hydrocortisone (CORTEF) 5 MG tablet  Last Written Prescription Date:   8/9/2021  Last Fill Quantity: 270,   # refills: 4  Last Office Visit :  8/9/2021  Future Office visit:  10/18/2023    Routing refill request to provider for review/approval because:  Per Providers request  Refer all refills to Provider for review       Nadia Treviño RN  Central Triage Red Flags/Med Refills

## 2023-01-07 ENCOUNTER — HEALTH MAINTENANCE LETTER (OUTPATIENT)
Age: 43
End: 2023-01-07

## 2023-02-09 ENCOUNTER — OFFICE VISIT (OUTPATIENT)
Dept: FAMILY MEDICINE | Facility: CLINIC | Age: 43
End: 2023-02-09
Payer: COMMERCIAL

## 2023-02-09 VITALS
HEART RATE: 92 BPM | OXYGEN SATURATION: 100 % | TEMPERATURE: 97.6 F | WEIGHT: 200 LBS | BODY MASS INDEX: 31.39 KG/M2 | HEIGHT: 67 IN

## 2023-02-09 DIAGNOSIS — R51.9 NONINTRACTABLE EPISODIC HEADACHE, UNSPECIFIED HEADACHE TYPE: Primary | ICD-10-CM

## 2023-02-09 LAB
ALBUMIN SERPL BCG-MCNC: 4.3 G/DL (ref 3.5–5.2)
ALP SERPL-CCNC: 82 U/L (ref 35–104)
ALT SERPL W P-5'-P-CCNC: 14 U/L (ref 10–35)
ANION GAP SERPL CALCULATED.3IONS-SCNC: 15 MMOL/L (ref 7–15)
AST SERPL W P-5'-P-CCNC: 18 U/L (ref 10–35)
BILIRUB SERPL-MCNC: 0.9 MG/DL
BUN SERPL-MCNC: 16.2 MG/DL (ref 6–20)
CALCIUM SERPL-MCNC: 9.1 MG/DL (ref 8.6–10)
CHLORIDE SERPL-SCNC: 100 MMOL/L (ref 98–107)
CREAT SERPL-MCNC: 0.58 MG/DL (ref 0.51–0.95)
DEPRECATED HCO3 PLAS-SCNC: 24 MMOL/L (ref 22–29)
GFR SERPL CREATININE-BSD FRML MDRD: >90 ML/MIN/1.73M2
GLUCOSE SERPL-MCNC: 102 MG/DL (ref 70–99)
HBA1C MFR BLD: 6 % (ref 0–5.6)
POTASSIUM SERPL-SCNC: 4.1 MMOL/L (ref 3.4–5.3)
PROT SERPL-MCNC: 7.4 G/DL (ref 6.4–8.3)
SODIUM SERPL-SCNC: 139 MMOL/L (ref 136–145)
T4 FREE SERPL-MCNC: 0.99 NG/DL (ref 0.9–1.7)
TSH SERPL DL<=0.005 MIU/L-ACNC: 0.18 UIU/ML (ref 0.3–4.2)

## 2023-02-09 PROCEDURE — 83036 HEMOGLOBIN GLYCOSYLATED A1C: CPT | Performed by: PHYSICIAN ASSISTANT

## 2023-02-09 PROCEDURE — 80053 COMPREHEN METABOLIC PANEL: CPT | Performed by: PHYSICIAN ASSISTANT

## 2023-02-09 PROCEDURE — 99203 OFFICE O/P NEW LOW 30 MIN: CPT | Performed by: PHYSICIAN ASSISTANT

## 2023-02-09 PROCEDURE — 36415 COLL VENOUS BLD VENIPUNCTURE: CPT | Performed by: PHYSICIAN ASSISTANT

## 2023-02-09 PROCEDURE — 84443 ASSAY THYROID STIM HORMONE: CPT | Performed by: PHYSICIAN ASSISTANT

## 2023-02-09 PROCEDURE — 84439 ASSAY OF FREE THYROXINE: CPT | Performed by: PHYSICIAN ASSISTANT

## 2023-02-09 RX ORDER — KETOROLAC TROMETHAMINE 30 MG/ML
30 INJECTION, SOLUTION INTRAMUSCULAR; INTRAVENOUS ONCE
Status: DISCONTINUED | OUTPATIENT
Start: 2023-02-09 | End: 2023-02-12

## 2023-02-09 ASSESSMENT — PAIN SCALES - GENERAL: PAINLEVEL: NO PAIN (0)

## 2023-02-09 NOTE — PROGRESS NOTES
"  Assessment & Plan     Nonintractable episodic headache, unspecified headache type  Will assess labs today  MRI ordered for further eval of more frequent headaches.  Toradol ordered for headache pain today but patient declined.  She would like to continue with tylenol  She will follow up as needed.  No alarm symptoms requiring more urgent evaluation today.  - Hemoglobin A1c; Future  - Comprehensive metabolic panel (BMP + Alb, Alk Phos, ALT, AST, Total. Bili, TP); Future  - TSH; Future  - T4, free; Future  - MR Brain w/o & w Contrast; Future  - ketorolac (TORADOL) injection 30 mg  - Hemoglobin A1c  - Comprehensive metabolic panel (BMP + Alb, Alk Phos, ALT, AST, Total. Bili, TP)  - TSH  - T4, free         BMI:   Estimated body mass index is 31.32 kg/m  as calculated from the following:    Height as of this encounter: 1.702 m (5' 7\").    Weight as of this encounter: 90.7 kg (200 lb).     Return in about 4 weeks (around 3/9/2023) for follow up as needed.    Myles Lizarraga PA-C  [facility]    Subjective   Fowsiya is a 43 year old presenting for the following health issues:  Headache (Numbness on left foot. Not sleeping. Tongue dry mouth. )      History of Present Illness       Headaches:   Since the patient's last clinic visit, headaches are: no change  The patient is getting headaches:  All day  She is not able to do normal daily activities when she has a migraine.  The patient is taking the following rescue/relief medications:  Tylenol   Patient states \"I get only a small amount of relief\" from the rescue/relief medications.   The patient is taking the following medications to prevent migraines:  No medications to prevent migraines  In the past 4 weeks, the patient has gone to an Urgent Care or Emergency Room 0 times times due to headaches.    Reason for visit:  Cari chavezin very tired headage  Symptom onset:  3-7 days ago  Symptoms include:  Not feeling well at all  Symptom " "intensity:  Moderate  Symptom progression:  Staying the same  Had these symptoms before:  No  What makes it worse:  No  What makes it better:  No    She eats 2-3 servings of fruits and vegetables daily.She consumes 1 sweetened beverage(s) daily.She exercises with enough effort to increase her heart rate 20 to 29 minutes per day.  She exercises with enough effort to increase her heart rate 3 or less days per week.   She is taking medications regularly.    Octavio presents to the clinic with fatigue and headache.  The headache has been ongoing now x 2 days off and on.  It is located around her head.  It resolves temporarily with tylenol. No n/v, changes in vision.  She does have some dizziness with standing but does not feel dizzy currently.   Of note, she has hx of hypopituitarism. Following with endocrinology.  At her last visit with endo she was having frequent headaches and MRI was recommended but not completed by patient.        Review of Systems   Constitutional, HEENT, cardiovascular, pulmonary, GI, , musculoskeletal, neuro, skin, endocrine and psych systems are negative, except as otherwise noted.      Objective    Pulse 92   Temp 97.6  F (36.4  C) (Temporal)   Ht 1.702 m (5' 7\")   Wt 90.7 kg (200 lb)   SpO2 100%   BMI 31.32 kg/m    Body mass index is 31.32 kg/m .  Physical Exam   GENERAL: healthy, alert and no distress  RESP: lungs clear to auscultation - no rales, rhonchi or wheezes  CV: regular rate and rhythm, normal S1 S2, no S3 or S4, no murmur, click or rub, no peripheral edema and peripheral pulses strong  MS: no gross musculoskeletal defects noted, no edema  SKIN: no suspicious lesions or rashes  NEURO: Normal strength and tone, mentation intact and speech normal  PSYCH: mentation appears normal, affect normal/bright            "

## 2023-02-15 NOTE — RESULT ENCOUNTER NOTE
Octavio-  Here are your recent results.   I am sorry for the delay in getting some of these labs back to you.   Your A1C (average blood sugar over 3 months) is slightly elevated but stable from your previous results.   Your TSH level is low which means that your current thyroid replacement dose may be too high.  I recommend that you take your 125 mg dose of levothyroxine Monday, Tuesday, Thursday, Friday and Saturday and take 1/2 tablet of this medication Wednesday and Sunday.  You should also have your thyroid level rechecked in 6 weeks at a lab only visit. It is also a good idea to touch base with your endocrinologist due to your symptoms and lab values.   Please let me know if you have questions!  Myles

## 2023-04-22 ENCOUNTER — HEALTH MAINTENANCE LETTER (OUTPATIENT)
Age: 43
End: 2023-04-22

## 2023-09-13 DIAGNOSIS — E27.49 SECONDARY ADRENAL INSUFFICIENCY (H): ICD-10-CM

## 2023-09-14 RX ORDER — HYDROCORTISONE 5 MG/1
TABLET ORAL
Qty: 90 TABLET | Refills: 0 | Status: SHIPPED | OUTPATIENT
Start: 2023-09-14 | End: 2023-10-18

## 2023-09-14 NOTE — TELEPHONE ENCOUNTER
hydrocortisone (CORTEF) 5 MG tablet   270 tablet 4 8/11/2022     Last Office Visit : 8-9-2021  Future Office visit:  10-

## 2023-10-18 ENCOUNTER — LAB (OUTPATIENT)
Dept: LAB | Facility: CLINIC | Age: 43
End: 2023-10-18
Payer: COMMERCIAL

## 2023-10-18 ENCOUNTER — OFFICE VISIT (OUTPATIENT)
Dept: ENDOCRINOLOGY | Facility: CLINIC | Age: 43
End: 2023-10-18
Payer: COMMERCIAL

## 2023-10-18 VITALS
WEIGHT: 208 LBS | DIASTOLIC BLOOD PRESSURE: 72 MMHG | SYSTOLIC BLOOD PRESSURE: 110 MMHG | BODY MASS INDEX: 32.65 KG/M2 | HEART RATE: 82 BPM | OXYGEN SATURATION: 100 % | HEIGHT: 67 IN

## 2023-10-18 DIAGNOSIS — E27.49 SECONDARY ADRENAL INSUFFICIENCY (H): Primary | ICD-10-CM

## 2023-10-18 DIAGNOSIS — E27.49 SECONDARY ADRENAL INSUFFICIENCY (H): ICD-10-CM

## 2023-10-18 DIAGNOSIS — E23.0 PANHYPOPITUITARISM (H): ICD-10-CM

## 2023-10-18 DIAGNOSIS — E23.6 EMPTY SELLA (H): ICD-10-CM

## 2023-10-18 DIAGNOSIS — R73.03 PREDIABETES: ICD-10-CM

## 2023-10-18 DIAGNOSIS — E23.0 HYPOGONADOTROPIC HYPOGONADISM (H): ICD-10-CM

## 2023-10-18 DIAGNOSIS — E23.0 PARTIAL HYPOPITUITARISM (H): ICD-10-CM

## 2023-10-18 LAB
ESTRADIOL SERPL-MCNC: 6 PG/ML
FSH SERPL IRP2-ACNC: 3.6 MIU/ML
HBA1C MFR BLD: 6.3 %
T4 FREE SERPL-MCNC: 1.4 NG/DL (ref 0.9–1.7)
TSH SERPL DL<=0.005 MIU/L-ACNC: <0.01 UIU/ML (ref 0.3–4.2)

## 2023-10-18 PROCEDURE — 82670 ASSAY OF TOTAL ESTRADIOL: CPT

## 2023-10-18 PROCEDURE — 83001 ASSAY OF GONADOTROPIN (FSH): CPT

## 2023-10-18 PROCEDURE — 84439 ASSAY OF FREE THYROXINE: CPT | Performed by: PATHOLOGY

## 2023-10-18 PROCEDURE — 84443 ASSAY THYROID STIM HORMONE: CPT | Performed by: PATHOLOGY

## 2023-10-18 PROCEDURE — 36415 COLL VENOUS BLD VENIPUNCTURE: CPT | Performed by: PATHOLOGY

## 2023-10-18 PROCEDURE — 99214 OFFICE O/P EST MOD 30 MIN: CPT

## 2023-10-18 PROCEDURE — 99000 SPECIMEN HANDLING OFFICE-LAB: CPT | Performed by: PATHOLOGY

## 2023-10-18 PROCEDURE — 83036 HEMOGLOBIN GLYCOSYLATED A1C: CPT

## 2023-10-18 RX ORDER — LEVOTHYROXINE SODIUM 112 UG/1
112 TABLET ORAL DAILY
Qty: 90 TABLET | Refills: 4 | COMMUNITY
Start: 2023-10-18 | End: 2024-04-02

## 2023-10-18 RX ORDER — LEVOTHYROXINE SODIUM 112 UG/1
112 TABLET ORAL DAILY
COMMUNITY
Start: 2023-10-18 | End: 2023-10-18

## 2023-10-18 RX ORDER — HYDROCORTISONE 5 MG/1
TABLET ORAL
Qty: 270 TABLET | Refills: 4 | Status: SHIPPED | OUTPATIENT
Start: 2023-10-18

## 2023-10-18 RX ORDER — MELOXICAM 7.5 MG/1
TABLET ORAL
COMMUNITY
Start: 2023-02-02

## 2023-10-18 ASSESSMENT — PAIN SCALES - GENERAL: PAINLEVEL: NO PAIN (0)

## 2023-10-18 NOTE — LETTER
10/18/2023       RE: Octavio Jesus  [address]  [address]  [place] [address]     Dear Colleague,    Thank you for referring your patient, Octavio Jesus, to the [facility] [place] at [facility]. Please see a copy of my visit note below.    Endocrinology     Assessment   Partial hypopituitarism (minus DI)  1. Central hypothyroidism.  Treat to high normal Free T4 target. On LT4 112  mcg/day.  Lab now    2. partially empty sella - as per # 1, 3, 4, 5  3. 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   She is off the OCP and reporting low libido  Labs to include estradiol and FSH today.  If still low, start transdermal estrogen and progesterone days 1-12 monthly   Vivelle dot  transdermal estradoil 50 mcg/24 hour -- we can increase later  Progesterone 200 mg /day days 1-12 starting in December.     Addendum  10/18/23 estradiol 6, FSH 3.6, FSH < 0.01, free T4 1.4, HgbA1c 6.3%.      4. secondary adrenal insufficiency -  HC 10/5 dose - Refilled.      5. Low IGF-1 consistent with possible GH deficiency.  - She has deficiency of 3 other pituitary hormones. We have never succeeded in getting her on GH     Obesity- is a risk for the pre-diabetes.  Recommend lifestyle/ weight loss     Pre-diabetes ; Family history of DM.  Labs HgbA1c  Recommend lifestyle. .     Chart review/prep time 1    29__ minutes spent on the date of the encounter doing chart review, history and exam, documentation and further activities as noted above.  Lyla Timmons MD    HPI   She does not require .  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  She also has prediabetes, hypovitaminosis D.  I  last saw her 8/9/2021.  At that time I ordered repeat pituitary /brain MRI deu to the fact she has having more headaches.  This has not been done.   Today she says the headaches resolved.   She couldn't sleep in February 2023.  The LT4 verma was reduced from 125 to 112 mcg/day then. This has helped her sleep but she also notes less energy since the dose reduction .  She continues on the hydrocortisone, emphasizing how she has significant diarrhea and quickly gets sick  if she doesn't take it.   She has had amenorrhea since the birth of her last child in 2001. The delivery was associated hemorrhage requiring blood transfusion. Subsequently, she did not have another period. She breast fed the baby for a 2-3 weeks at most, was unable to continue.  I started Octavio on therapy in 2008. In the past we used OCPs.  She has not taken in some time.  She is reporting low libido today .  Today she is saying she needs estrogen .    Today she reports she is  on   HC 10 mg and 5 mg afternoon  LT4 125 mcg/day  We have repeatedly been unsuccessful in getting GH.     Labs:  10/13/18: HgbA1c 5.8%  7/8/19  weight 198# HgbA1c 5.9%, Na 135, free T4 1.14, [id number]  12/23/2020 : Na 138, free T4 1.31, HgbA1c 6%  8/10/2021 HgbA1c 5.8%, Na 140, free T4 0.99, 25OHD 27  2/9/23 weight 200# , BMI 31.96, TSH 0.18, free T4 0.99, HgbA1c 6, glucose 102    ROS   Weight is up 8 lbs in the last weeks  Have to eat something sometimes.   Currently sleeping well, perfect.    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 270 tablet 4    levothyroxine (SYNTHROID/LEVOTHROID) 112 MCG tablet Take 1 tablet (112 mcg) by mouth daily 90 tablet 4    meloxicam (MOBIC) 7.5 MG tablet       vitamin D3 (CHOLECALCIFEROL) 1000 units (25 mcg) tablet Take 1 tablet (1,000 Units) by mouth daily 200 tablet 1       Personal Hx   Behavioral history: No tobacco use.   Home environment: No secondhand tobacco smoke in home.   ; 1 job with daytime hours.  Latest she works is 8-9 PM.    No longer working night shift    Family history  Family History   Problem Relation Age of Onset    Diabetes Mother     Hypertension Father      Physical Exam   GENERAL NAD: no mask  /72   Pulse 82   Ht 1.702 m (5' 7\")   Wt 94.3 kg (208 lb)   SpO2 100%   BMI 32.58 kg/m    SKIN: normal color, temperature, texture   HEENT: PER, no scleral icterus, eyelid retraction, stare, lid lag, proptosis or conjunctival injection.  .    NECK: supple.  No visible or palpable neck masses, or goiter.   LUNGS: clear to auscultation bilaterally.   CARDIAC: RRR, S1, S2 without murmurs, rubs or gallops.    BACK: normal spinal contour.    NEURO: Alert, responds appropriately to questions,  moves all extremities, DTRs 0/4, gait normal, no tremor of the outstretched hand  DATA REVIEW:     Latest Reference Range & Units 02/09/23 15:26 02/09/23 15:27 10/18/23 14:18   Sodium 136 - 145 mmol/L  139    Potassium 3.4 - 5.3 mmol/L  4.1    Chloride 98 - 107 mmol/L  100    Carbon Dioxide (CO2) 22 - 29 mmol/L  24    Urea Nitrogen 6.0 - 20.0 mg/dL  16.2    Creatinine 0.51 - 0.95 mg/dL  0.58    GFR Estimate >60 mL/min/1.73m2  >90    Calcium 8.6 - 10.0 mg/dL  9.1    Anion Gap 7 - 15 mmol/L  15    Albumin 3.5 - 5.2 g/dL  4.3    Protein Total 6.4 - 8.3 g/dL  7.4    Alkaline Phosphatase 35 - 104 U/L  82    ALT 10 - 35 U/L  14    AST 10 - 35 U/L  18    Bilirubin Total <=1.2 mg/dL  0.9  "   Estradiol pg/mL   6   FSH mIU/mL   3.6   Glucose 70 - 99 mg/dL  102 (H)    Hemoglobin A1C <5.7 % 6.0 (H)  6.3 (H)   T4 Free 0.90 - 1.70 ng/dL  0.99 1.40   TSH 0.30 - 4.20 uIU/mL  0.18 (L) <0.01 (L)   (H): Data is abnormally high  (L): Data is abnormally low    Lyla Timmons MD

## 2023-10-18 NOTE — PATIENT INSTRUCTIONS
Labs today    I recommend the following regimen   Progesterone pills  for days 1-12. This will induce a period.  Transdermal estradiol patch you apply to the skin.    I  will submit the prescriptions for these after I see the labs.

## 2023-10-18 NOTE — PROGRESS NOTES
Endocrinology     Assessment   Partial hypopituitarism (minus DI)  1. Central hypothyroidism.  Treat to high normal Free T4 target. On LT4 112  mcg/day.  Lab now    2. partially empty sella - as per # 1, 3, 4, 5  3. 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   She is off the OCP and reporting low libido  Labs to include estradiol and FSH today.  If still low, start transdermal estrogen and progesterone days 1-12 monthly   Cynthiaelle dot  transdermal estradoil 50 mcg/24 hour -- we can increase later  Progesterone 200 mg /day days 1-12 starting in December.     Addendum  10/18/23 estradiol 6, FSH 3.6, FSH < 0.01, free T4 1.4, HgbA1c 6.3%.      4. secondary adrenal insufficiency -  HC 10/5 dose - Refilled.      5. Low IGF-1 consistent with possible GH deficiency.  - She has deficiency of 3 other pituitary hormones. We have never succeeded in getting her on GH     Obesity- is a risk for the pre-diabetes.  Recommend lifestyle/ weight loss     Pre-diabetes ; Family history of DM.  Labs HgbA1c  Recommend lifestyle. .     Chart review/prep time 1    29__ minutes spent on the date of the encounter doing chart review, history and exam, documentation and further activities as noted above.  Lyla Timmons MD    HPI   She does not require .  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  She also has prediabetes, hypovitaminosis D.  I last saw her 8/9/2021.  At that time I ordered repeat pituitary /brain MRI deu to the fact she has having more headaches.  This has not been done.   Today she says the headaches resolved.   She couldn't sleep in February 2023.  The LT4 verma was reduced from 125 to 112 mcg/day then. This has helped her sleep but she also notes  less energy since the dose reduction .  She continues on the hydrocortisone, emphasizing how she has significant diarrhea and quickly gets sick  if she doesn't take it.   She has had amenorrhea since the birth of her last child in 2001. The delivery was associated hemorrhage requiring blood transfusion. Subsequently, she did not have another period. She breast fed the baby for a 2-3 weeks at most, was unable to continue.  I started Octavio on therapy in 2008. In the past we used OCPs.  She has not taken in some time.  She is reporting low libido today .  Today she is saying she needs estrogen .    Today she reports she is  on   HC 10 mg and 5 mg afternoon  LT4 125 mcg/day  We have repeatedly been unsuccessful in getting GH.     Labs:  10/13/18: HgbA1c 5.8%  7/8/19  weight 198# HgbA1c 5.9%, Na 135, free T4 1.14, [id number]  12/23/2020 : Na 138, free T4 1.31, HgbA1c 6%  8/10/2021 HgbA1c 5.8%, Na 140, free T4 0.99, 25OHD 27  2/9/23 weight 200# , BMI 31.96, TSH 0.18, free T4 0.99, HgbA1c 6, glucose 102    ROS   Weight is up 8 lbs in the last weeks  Have to eat something sometimes.   Currently sleeping well, perfect.    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 270 tablet 4    levothyroxine (SYNTHROID/LEVOTHROID) 112 MCG tablet Take 1 tablet (112 mcg) by mouth daily 90 tablet 4    meloxicam (MOBIC) 7.5 MG tablet       vitamin D3 (CHOLECALCIFEROL) 1000 units (25 mcg) tablet  "Take 1 tablet (1,000 Units) by mouth daily 200 tablet 1       Personal Hx   Behavioral history: No tobacco use.   Home environment: No secondhand tobacco smoke in home.   ; 1 job with daytime hours.  Latest she works is 8-9 PM.    No longer working night shift    Family history  Family History   Problem Relation Age of Onset    Diabetes Mother     Hypertension Father      Physical Exam   GENERAL NAD: no mask  /72   Pulse 82   Ht 1.702 m (5' 7\")   Wt 94.3 kg (208 lb)   SpO2 100%   BMI 32.58 kg/m    SKIN: normal color, temperature, texture   HEENT: PER, no scleral icterus, eyelid retraction, stare, lid lag, proptosis or conjunctival injection.  .    NECK: supple.  No visible or palpable neck masses, or goiter.   LUNGS: clear to auscultation bilaterally.   CARDIAC: RRR, S1, S2 without murmurs, rubs or gallops.    BACK: normal spinal contour.    NEURO: Alert, responds appropriately to questions,  moves all extremities, DTRs 0/4, gait normal, no tremor of the outstretched hand  DATA REVIEW:     Latest Reference Range & Units 02/09/23 15:26 02/09/23 15:27 10/18/23 14:18   Sodium 136 - 145 mmol/L  139    Potassium 3.4 - 5.3 mmol/L  4.1    Chloride 98 - 107 mmol/L  100    Carbon Dioxide (CO2) 22 - 29 mmol/L  24    Urea Nitrogen 6.0 - 20.0 mg/dL  16.2    Creatinine 0.51 - 0.95 mg/dL  0.58    GFR Estimate >60 mL/min/1.73m2  >90    Calcium 8.6 - 10.0 mg/dL  9.1    Anion Gap 7 - 15 mmol/L  15    Albumin 3.5 - 5.2 g/dL  4.3    Protein Total 6.4 - 8.3 g/dL  7.4    Alkaline Phosphatase 35 - 104 U/L  82    ALT 10 - 35 U/L  14    AST 10 - 35 U/L  18    Bilirubin Total <=1.2 mg/dL  0.9    Estradiol pg/mL   6   FSH mIU/mL   3.6   Glucose 70 - 99 mg/dL  102 (H)    Hemoglobin A1C <5.7 % 6.0 (H)  6.3 (H)   T4 Free 0.90 - 1.70 ng/dL  0.99 1.40   TSH 0.30 - 4.20 uIU/mL  0.18 (L) <0.01 (L)   (H): Data is abnormally high  (L): Data is abnormally low  "

## 2023-10-19 RX ORDER — ESTRADIOL 0.05 MG/D
1 PATCH, EXTENDED RELEASE TRANSDERMAL
Qty: 8 PATCH | Refills: 11 | Status: SHIPPED | OUTPATIENT
Start: 2023-10-19

## 2023-10-19 RX ORDER — PROGESTERONE 200 MG/1
CAPSULE ORAL
Qty: 12 CAPSULE | Refills: 11 | Status: SHIPPED | OUTPATIENT
Start: 2023-10-19

## 2023-10-22 PROBLEM — E23.0 HYPOGONADOTROPIC HYPOGONADISM (H): Status: ACTIVE | Noted: 2023-10-22

## 2023-12-13 NOTE — TELEPHONE ENCOUNTER
Calling for hydrocortisone refill. She saw her provider in 1/2018.   Medication refilled.   Advised to call clinic during working hours.    Cephalexin Counseling: I counseled the patient regarding use of cephalexin as an antibiotic for prophylactic and/or therapeutic purposes. Cephalexin (commonly prescribed under brand name Keflex) is a cephalosporin antibiotic which is active against numerous classes of bacteria, including most skin bacteria. Side effects may include nausea, diarrhea, gastrointestinal upset, rash, hives, yeast infections, and in rare cases, hepatitis, kidney disease, seizures, fever, confusion, neurologic symptoms, and others. Patients with severe allergies to penicillin medications are cautioned that there is about a 10% incidence of cross-reactivity with cephalosporins. When possible, patients with penicillin allergies should use alternatives to cephalosporins for antibiotic therapy. Ivermectin Counseling:  Patient instructed to take medication on an empty stomach with a full glass of water.  Patient informed of potential adverse effects including but not limited to nausea, diarrhea, dizziness, itching, and swelling of the extremities or lymph nodes.  The patient verbalized understanding of the proper use and possible adverse effects of ivermectin.  All of the patient's questions and concerns were addressed. Dupixent Pregnancy And Lactation Text: This medication likely crosses the placenta but the risk for the fetus is uncertain. This medication is excreted in breast milk. Carac Counseling:  I discussed with the patient the risks of Carac including but not limited to erythema, scaling, itching, weeping, crusting, and pain. Topical Sulfur Applications Pregnancy And Lactation Text: This medication is considered safe during pregnancy and breast feeding secondary to limited systemic absorption. Rhofade Counseling: Rhofade is a topical medication which can decrease superficial blood flow where applied. Side effects are uncommon and include stinging, redness and allergic reactions. Olanzapine Pregnancy And Lactation Text: This medication is pregnancy category C.   There are no adequate and well controlled trials with olanzapine in pregnant females.  Olanzapine should be used during pregnancy only if the potential benefit justifies the potential risk to the fetus.   In a study in lactating healthy women, olanzapine was excreted in breast milk.  It is recommended that women taking olanzapine should not breast feed. Rituxan Counseling:  I discussed with the patient the risks of Rituxan infusions. Side effects can include infusion reactions, severe drug rashes including mucocutaneous reactions, reactivation of latent hepatitis and other infections and rarely progressive multifocal leukoencephalopathy.  All of the patient's questions and concerns were addressed. Zyclara Counseling:  I discussed with the patient the risks of imiquimod including but not limited to erythema, scaling, itching, weeping, crusting, and pain.  Patient understands that the inflammatory response to imiquimod is variable from person to person and was educated regarded proper titration schedule.  If flu-like symptoms develop, patient knows to discontinue the medication and contact us. SSKI Counseling:  I discussed with the patient the risks of SSKI including but not limited to thyroid abnormalities, metallic taste, GI upset, fever, headache, acne, arthralgias, paraesthesias, lymphadenopathy, easy bleeding, arrhythmias, and allergic reaction. Mirvaso Pregnancy And Lactation Text: This medication has not been assigned a Pregnancy Risk Category. It is unknown if the medication is excreted in breast milk. Cibinqo Counseling: I discussed with the patient the risks of Cibinqo therapy including but not limited to common cold, nausea, headache, cold sores, increased blood CPK levels, dizziness, UTIs, fatigue, acne, and vomitting. Live vaccines should be avoided.  This medication has been linked to serious infections; higher rate of mortality; malignancy and lymphoproliferative disorders; major adverse cardiovascular events; thrombosis; thrombocytopenia and lymphopenia; lipid elevations; and retinal detachment. Sotyktu Pregnancy And Lactation Text: There is insufficient data to evaluate whether or not Sotyktu is safe to use during pregnancy.   It is not known if Sotyktu passes into breast milk and whether or not it is safe to use when breastfeeding.   High Dose Vitamin A Counseling: Side effects reviewed, pt to contact office should one occur. Fluconazole Counseling:  Patient counseled regarding adverse effects of fluconazole including but not limited to headache, diarrhea, nausea, upset stomach, liver function test abnormalities, taste disturbance, and stomach pain.  There is a rare possibility of liver failure that can occur when taking fluconazole.  The patient understands that monitoring of LFTs and kidney function test may be required, especially at baseline. The patient verbalized understanding of the proper use and possible adverse effects of fluconazole.  All of the patient's questions and concerns were addressed. Metronidazole Pregnancy And Lactation Text: This medication is Pregnancy Category B and considered safe during pregnancy.  It is also excreted in breast milk. Cellcept Pregnancy And Lactation Text: This medication is Pregnancy Category D and isn't considered safe during pregnancy. It is unknown if this medication is excreted in breast milk. Fluconazole Pregnancy And Lactation Text: This medication is Pregnancy Category C and it isn't know if it is safe during pregnancy. It is also excreted in breast milk. Minocycline Counseling: Patient advised regarding possible photosensitivity and discoloration of the teeth, skin, lips, tongue and gums.  Patient instructed to avoid sunlight, if possible.  When exposed to sunlight, patients should wear protective clothing, sunglasses, and sunscreen.  The patient was instructed to call the office immediately if the following severe adverse effects occur:  hearing changes, easy bruising/bleeding, severe headache, or vision changes.  The patient verbalized understanding of the proper use and possible adverse effects of minocycline.  All of the patient's questions and concerns were addressed. Taltz Counseling: I discussed with the patient the risks of ixekizumab including but not limited to immunosuppression, serious infections, worsening of inflammatory bowel disease and drug reactions.  The patient understands that monitoring is required including a PPD at baseline and must alert us or the primary physician if symptoms of infection or other concerning signs are noted. Hydroquinone Counseling:  Patient advised that medication may result in skin irritation, lightening (hypopigmentation), dryness, and burning.  In the event of skin irritation, the patient was advised to reduce the amount of the drug applied or use it less frequently.  Rarely, spots that are treated with hydroquinone can become darker (pseudoochronosis).  Should this occur, patient instructed to stop medication and call the office. The patient verbalized understanding of the proper use and possible adverse effects of hydroquinone.  All of the patient's questions and concerns were addressed. Cyclophosphamide Counseling:  I discussed with the patient the risks of cyclophosphamide including but not limited to hair loss, hormonal abnormalities, decreased fertility, abdominal pain, diarrhea, nausea and vomiting, bone marrow suppression and infection. The patient understands that monitoring is required while taking this medication. Quinolones Counseling:  I discussed with the patient the risks of fluoroquinolones including but not limited to GI upset, allergic reaction, drug rash, diarrhea, dizziness, photosensitivity, yeast infections, liver function test abnormalities, tendonitis/tendon rupture. Xeljanz Counseling: I discussed with the patient the risks of Xeljanz therapy including increased risk of infection, liver issues, headache, diarrhea, or cold symptoms. Live vaccines should be avoided. They were instructed to call if they have any problems. Colchicine Pregnancy And Lactation Text: This medication is Pregnancy Category C and isn't considered safe during pregnancy. It is excreted in breast milk. Topical Clindamycin Counseling: Patient counseled that this medication may cause skin irritation or allergic reactions.  In the event of skin irritation, the patient was advised to reduce the amount of the drug applied or use it less frequently.   The patient verbalized understanding of the proper use and possible adverse effects of clindamycin.  All of the patient's questions and concerns were addressed. Hydroxychloroquine Counseling:  I discussed with the patient that a baseline ophthalmologic exam is needed at the start of therapy and every year thereafter while on therapy. A CBC may also be warranted for monitoring.  The side effects of this medication were discussed with the patient, including but not limited to agranulocytosis, aplastic anemia, seizures, rashes, retinopathy, and liver toxicity. Patient instructed to call the office should any adverse effect occur.  The patient verbalized understanding of the proper use and possible adverse effects of Plaquenil.  All the patient's questions and concerns were addressed. Finasteride Pregnancy And Lactation Text: This medication is absolutely contraindicated during pregnancy. It is unknown if it is excreted in breast milk. Erivedge Pregnancy And Lactation Text: This medication is Pregnancy Category X and is absolutely contraindicated during pregnancy. It is unknown if it is excreted in breast milk. Enbrel Counseling:  I discussed with the patient the risks of etanercept including but not limited to myelosuppression, immunosuppression, autoimmune hepatitis, demyelinating diseases, lymphoma, and infections.  The patient understands that monitoring is required including a PPD at baseline and must alert us or the primary physician if symptoms of infection or other concerning signs are noted. Oral Minoxidil Counseling- I discussed with the patient the risks of oral minoxidil including but not limited to shortness of breath, swelling of the feet or ankles, dizziness, lightheadedness, unwanted hair growth and allergic reaction.  The patient verbalized understanding of the proper use and possible adverse effects of oral minoxidil.  All of the patient's questions and concerns were addressed. Opzelura Counseling:  I discussed with the patient the risks of Opzelura including but not limited to nasopharngitis, bronchitis, ear infection, eosinophila, hives, diarrhea, folliculitis, tonsillitis, and rhinorrhea.  Taken orally, this medication has been linked to serious infections; higher rate of mortality; malignancy and lymphoproliferative disorders; major adverse cardiovascular events; thrombosis; thrombocytopenia, anemia, and neutropenia; and lipid elevations. Wartpeel Counseling:  I discussed with the patient the risks of Wartpeel including but not limited to erythema, scaling, itching, weeping, crusting, and pain. Detail Level: Simple Carac Pregnancy And Lactation Text: This medication is Pregnancy Category X and contraindicated in pregnancy and in women who may become pregnant. It is unknown if this medication is excreted in breast milk. Cephalexin Pregnancy And Lactation Text: This medication is Pregnancy Category B and considered safe during pregnancy.  It is also excreted in breast milk but can be used safely for shorter doses. Cimetidine Counseling:  I discussed with the patient the risks of Cimetidine including but not limited to gynecomastia, headache, diarrhea, nausea, drowsiness, arrhythmias, pancreatitis, skin rashes, psychosis, bone marrow suppression and kidney toxicity. Sski Pregnancy And Lactation Text: This medication is Pregnancy Category D and isn't considered safe during pregnancy. It is excreted in breast milk. Cibinqo Pregnancy And Lactation Text: It is unknown if this medication will adversely affect pregnancy or breast feeding.  You should not take this medication if you are currently pregnant or planning a pregnancy or while breastfeeding. High Dose Vitamin A Pregnancy And Lactation Text: High dose vitamin A therapy is contraindicated during pregnancy and breast feeding. Ivermectin Pregnancy And Lactation Text: This medication is Pregnancy Category C and it isn't known if it is safe during pregnancy. It is also excreted in breast milk. Rituxan Pregnancy And Lactation Text: This medication is Pregnancy Category C and it isn't know if it is safe during pregnancy. It is unknown if this medication is excreted in breast milk but similar antibodies are known to be excreted. Zyclara Pregnancy And Lactation Text: This medication is Pregnancy Category C. It is unknown if this medication is excreted in breast milk. Siliq Counseling:  I discussed with the patient the risks of Siliq including but not limited to new or worsening depression, suicidal thoughts and behavior, immunosuppression, malignancy, posterior leukoencephalopathy syndrome, and serious infections.  The patient understands that monitoring is required including a PPD at baseline and must alert us or the primary physician if symptoms of infection or other concerning signs are noted. There is also a special program designed to monitor depression which is required with Siliq. Litfulo Counseling: I discussed with the patient the risks of Litfulo therapy including but not limited to upper respiratory tract infections, shingles, cold sores, and nausea. Live vaccines should be avoided.  This medication has been linked to serious infections; higher rate of mortality; malignancy and lymphoproliferative disorders; major adverse cardiovascular events; thrombosis; gastrointestinal perforations; neutropenia; lymphopenia; anemia; liver enzyme elevations; and lipid elevations. Thalidomide Counseling: I discussed with the patient the risks of thalidomide including but not limited to birth defects, anxiety, weakness, chest pain, dizziness, cough and severe allergy. Taltz Pregnancy And Lactation Text: The risk during pregnancy and breastfeeding is uncertain with this medication. Solaraze Counseling:  I discussed with the patient the risks of Solaraze including but not limited to erythema, scaling, itching, weeping, crusting, and pain. Cimetidine Pregnancy And Lactation Text: This medication is Pregnancy Category B and is considered safe during pregnancy. It is also excreted in breast milk and breast feeding isn't recommended. Xeldelaneyz Pregnancy And Lactation Text: This medication is Pregnancy Category D and is not considered safe during pregnancy.  The risk during breast feeding is also uncertain. Birth Control Pills Counseling: Birth Control Pill Counseling: I discussed with the patient the potential side effects of OCPs including but not limited to increased risk of stroke, heart attack, thrombophlebitis, deep venous thrombosis, hepatic adenomas, breast changes, GI upset, headaches, and depression.  The patient verbalized understanding of the proper use and possible adverse effects of OCPs. All of the patient's questions and concerns were addressed. Hydroxychloroquine Pregnancy And Lactation Text: This medication has been shown to cause fetal harm but it isn't assigned a Pregnancy Risk Category. There are small amounts excreted in breast milk. Cyclophosphamide Pregnancy And Lactation Text: This medication is Pregnancy Category D and it isn't considered safe during pregnancy. This medication is excreted in breast milk. Dapsone Counseling: I discussed with the patient the risks of dapsone including but not limited to hemolytic anemia, agranulocytosis, rashes, methemoglobinemia, kidney failure, peripheral neuropathy, headaches, GI upset, and liver toxicity.  Patients who start dapsone require monitoring including baseline LFTs and weekly CBCs for the first month, then every month thereafter.  The patient verbalized understanding of the proper use and possible adverse effects of dapsone.  All of the patient's questions and concerns were addressed. Griseofulvin Counseling:  I discussed with the patient the risks of griseofulvin including but not limited to photosensitivity, cytopenia, liver damage, nausea/vomiting and severe allergy.  The patient understands that this medication is best absorbed when taken with a fatty meal (e.g., ice cream or french fries). Topical Clindamycin Pregnancy And Lactation Text: This medication is Pregnancy Category B and is considered safe during pregnancy. It is unknown if it is excreted in breast milk. Hydroquinone Pregnancy And Lactation Text: This medication has not been assigned a Pregnancy Risk Category but animal studies failed to show danger with the topical medication. It is unknown if the medication is excreted in breast milk. Opzelura Pregnancy And Lactation Text: There is insufficient data to evaluate drug-associated risk for major birth defects, miscarriage, or other adverse maternal or fetal outcomes.  There is a pregnancy registry that monitors pregnancy outcomes in pregnant persons exposed to the medication during pregnancy.  It is unknown if this medication is excreted in breast milk.  Do not breastfeed during treatment and for about 4 weeks after the last dose. Dapsone Pregnancy And Lactation Text: This medication is Pregnancy Category C and is not considered safe during pregnancy or breast feeding. Topical Ketoconazole Counseling: Patient counseled that this medication may cause skin irritation or allergic reactions.  In the event of skin irritation, the patient was advised to reduce the amount of the drug applied or use it less frequently.   The patient verbalized understanding of the proper use and possible adverse effects of ketoconazole.  All of the patient's questions and concerns were addressed. Birth Control Pills Pregnancy And Lactation Text: This medication should be avoided if pregnant and for the first 30 days post-partum. Low Dose Naltrexone Counseling- I discussed with the patient the potential risks and side effects of low dose naltrexone including but not limited to: more vivid dreams, headaches, nausea, vomiting, abdominal pain, fatigue, dizziness, and anxiety. Enbrel Pregnancy And Lactation Text: This medication is Pregnancy Category B and is considered safe during pregnancy. It is unknown if this medication is excreted in breast milk. Imiquimod Counseling:  I discussed with the patient the risks of imiquimod including but not limited to erythema, scaling, itching, weeping, crusting, and pain.  Patient understands that the inflammatory response to imiquimod is variable from person to person and was educated regarded proper titration schedule.  If flu-like symptoms develop, patient knows to discontinue the medication and contact us. Include Pregnancy/Lactation Warning?: No Rifampin Counseling: I discussed with the patient the risks of rifampin including but not limited to liver damage, kidney damage, red-orange body fluids, nausea/vomiting and severe allergy. Oral Minoxidil Pregnancy And Lactation Text: This medication should only be used when clearly needed if you are pregnant, attempting to become pregnant or breast feeding. Opioid Counseling: I discussed with the patient the potential side effects of opioids including but not limited to addiction, altered mental status, and depression. I stressed avoiding alcohol, benzodiazepines, muscle relaxants and sleep aids unless specifically okayed by a physician. The patient verbalized understanding of the proper use and possible adverse effects of opioids. All of the patient's questions and concerns were addressed. They were instructed to flush the remaining pills down the toilet if they did not need them for pain. Calcipotriene Counseling:  I discussed with the patient the risks of calcipotriene including but not limited to erythema, scaling, itching, and irritation. Libtayo Counseling- I discussed with the patient the risks of Libtayo including but not limited to nausea, vomiting, diarrhea, and bone or muscle pain.  The patient verbalized understanding of the proper use and possible adverse effects of Libtayo.  All of the patient's questions and concerns were addressed. Clindamycin Counseling: I counseled the patient regarding use of clindamycin as an antibiotic for prophylactic and/or therapeutic purposes. Clindamycin is active against numerous classes of bacteria, including skin bacteria. Side effects may include nausea, diarrhea, gastrointestinal upset, rash, hives, yeast infections, and in rare cases, colitis. Clindamycin Pregnancy And Lactation Text: This medication can be used in pregnancy if certain situations. Clindamycin is also present in breast milk. Solaraze Pregnancy And Lactation Text: This medication is Pregnancy Category B and is considered safe. There is some data to suggest avoiding during the third trimester. It is unknown if this medication is excreted in breast milk. Doxepin Counseling:  Patient advised that the medication is sedating and not to drive a car after taking this medication. Patient informed of potential adverse effects including but not limited to dry mouth, urinary retention, and blurry vision.  The patient verbalized understanding of the proper use and possible adverse effects of doxepin.  All of the patient's questions and concerns were addressed. Calcipotriene Pregnancy And Lactation Text: The use of this medication during pregnancy or lactation is not recommended as there is insufficient data. Adbry Counseling: I discussed with the patient the risks of tralokinumab including but not limited to eye infection and irritation, cold sores, injection site reactions, worsening of asthma, allergic reactions and increased risk of parasitic infection.  Live vaccines should be avoided while taking tralokinumab. The patient understands that monitoring is required and they must alert us or the primary physician if symptoms of infection or other concerning signs are noted. Cyclosporine Counseling:  I discussed with the patient the risks of cyclosporine including but not limited to hypertension, gingival hyperplasia,myelosuppression, immunosuppression, liver damage, kidney damage, neurotoxicity, lymphoma, and serious infections. The patient understands that monitoring is required including baseline blood pressure, CBC, CMP, lipid panel and uric acid, and then 1-2 times monthly CMP and blood pressure. Griseofulvin Pregnancy And Lactation Text: This medication is Pregnancy Category X and is known to cause serious birth defects. It is unknown if this medication is excreted in breast milk but breast feeding should be avoided. Tremfya Counseling: I discussed with the patient the risks of guselkumab including but not limited to immunosuppression, serious infections, and drug reactions.  The patient understands that monitoring is required including a PPD at baseline and must alert us or the primary physician if symptoms of infection or other concerning signs are noted. Gabapentin Counseling: I discussed with the patient the risks of gabapentin including but not limited to dizziness, somnolence, fatigue and ataxia. Itraconazole Counseling:  I discussed with the patient the risks of itraconazole including but not limited to liver damage, nausea/vomiting, neuropathy, and severe allergy.  The patient understands that this medication is best absorbed when taken with acidic beverages such as non-diet cola or ginger ale.  The patient understands that monitoring is required including baseline LFTs and repeat LFTs at intervals.  The patient understands that they are to contact us or the primary physician if concerning signs are noted. Low Dose Naltrexone Pregnancy And Lactation Text: Naltrexone is pregnancy category C.  There have been no adequate and well-controlled studies in pregnant women.  It should be used in pregnancy only if the potential benefit justifies the potential risk to the fetus.   Limited data indicates that naltrexone is minimally excreted into breastmilk. Rifampin Pregnancy And Lactation Text: This medication is Pregnancy Category C and it isn't know if it is safe during pregnancy. It is also excreted in breast milk and should not be used if you are breast feeding. Adbry Pregnancy And Lactation Text: It is unknown if this medication will adversely affect pregnancy or breast feeding. Spironolactone Counseling: Patient advised regarding risks of diarrhea, abdominal pain, hyperkalemia, birth defects (for female patients), liver toxicity and renal toxicity. The patient may need blood work to monitor liver and kidney function and potassium levels while on therapy. The patient verbalized understanding of the proper use and possible adverse effects of spironolactone.  All of the patient's questions and concerns were addressed. Aklief counseling:  Patient advised to apply a pea-sized amount only at bedtime and wait 30 minutes after washing their face before applying.  If too drying, patient may add a non-comedogenic moisturizer.  The most commonly reported side effects including irritation, redness, scaling, dryness, stinging, burning, itching, and increased risk of sunburn.  The patient verbalized understanding of the proper use and possible adverse effects of retinoids.  All of the patient's questions and concerns were addressed. Opioid Pregnancy And Lactation Text: These medications can lead to premature delivery and should be avoided during pregnancy. These medications are also present in breast milk in small amounts. Otezla Counseling: The side effects of Otezla were discussed with the patient, including but not limited to worsening or new depression, weight loss, diarrhea, nausea, upper respiratory tract infection, and headache. Patient instructed to call the office should any adverse effect occur.  The patient verbalized understanding of the proper use and possible adverse effects of Otezla.  All the patient's questions and concerns were addressed. Cantharidin Counseling:  I discussed with the patient the risks of Cantharidin including but not limited to pain, redness, burning, itching, and blistering. Libtayo Pregnancy And Lactation Text: This medication is contraindicated in pregnancy and when breast feeding. Litfulo Pregnancy And Lactation Text: Based on animal studies, Lifulo may cause embryo-fetal harm when administered to pregnant women.  The medication should not be used in pregnancy.  Breastfeeding is not recommended during treatment. Humira Counseling:  I discussed with the patient the risks of adalimumab including but not limited to myelosuppression, immunosuppression, autoimmune hepatitis, demyelinating diseases, lymphoma, and serious infections.  The patient understands that monitoring is required including a PPD at baseline and must alert us or the primary physician if symptoms of infection or other concerning signs are noted. Picato Counseling:  I discussed with the patient the risks of Picato including but not limited to erythema, scaling, itching, weeping, crusting, and pain. Acitretin Counseling:  I discussed with the patient the risks of acitretin including but not limited to hair loss, dry lips/skin/eyes, liver damage, hyperlipidemia, depression/suicidal ideation, photosensitivity.  Serious rare side effects can include but are not limited to pancreatitis, pseudotumor cerebri, bony changes, clot formation/stroke/heart attack.  Patient understands that alcohol is contraindicated since it can result in liver toxicity and significantly prolong the elimination of the drug by many years. Winlevi Counseling:  I discussed with the patient the risks of topical clascoterone including but not limited to erythema, scaling, itching, and stinging. Patient voiced their understanding. Odomzo Counseling- I discussed with the patient the risks of Odomzo including but not limited to nausea, vomiting, diarrhea, constipation, weight loss, changes in the sense of taste, decreased appetite, muscle spasms, and hair loss.  The patient verbalized understanding of the proper use and possible adverse effects of Odomzo.  All of the patient's questions and concerns were addressed. 5-Fu Counseling: 5-Fluorouracil Counseling:  I discussed with the patient the risks of 5-fluorouracil including but not limited to erythema, scaling, itching, weeping, crusting, and pain. Otezla Pregnancy And Lactation Text: This medication is Pregnancy Category C and it isn't known if it is safe during pregnancy. It is unknown if it is excreted in breast milk. Winlevi Pregnancy And Lactation Text: This medication is considered safe during pregnancy and breastfeeding. Simponi Counseling:  I discussed with the patient the risks of golimumab including but not limited to myelosuppression, immunosuppression, autoimmune hepatitis, demyelinating diseases, lymphoma, and serious infections.  The patient understands that monitoring is required including a PPD at baseline and must alert us or the primary physician if symptoms of infection or other concerning signs are noted. Doxycycline Counseling:  Patient counseled regarding possible photosensitivity and increased risk for sunburn.  Patient instructed to avoid sunlight, if possible.  When exposed to sunlight, patients should wear protective clothing, sunglasses, and sunscreen.  The patient was instructed to call the office immediately if the following severe adverse effects occur:  hearing changes, easy bruising/bleeding, severe headache, or vision changes.  The patient verbalized understanding of the proper use and possible adverse effects of doxycycline.  All of the patient's questions and concerns were addressed. Olumiant Counseling: I discussed with the patient the risks of Olumiant therapy including but not limited to upper respiratory tract infections, shingles, cold sores, and nausea. Live vaccines should be avoided.  This medication has been linked to serious infections; higher rate of mortality; malignancy and lymphoproliferative disorders; major adverse cardiovascular events; thrombosis; gastrointestinal perforations; neutropenia; lymphopenia; anemia; liver enzyme elevations; and lipid elevations. Soolantra Counseling: I discussed with the patients the risks of topial Soolantra. This is a medicine which decreases the number of mites and inflammation in the skin. You experience burning, stinging, eye irritation or allergic reactions.  Please call our office if you develop any problems from using this medication. Doxepin Pregnancy And Lactation Text: This medication is Pregnancy Category C and it isn't known if it is safe during pregnancy. It is also excreted in breast milk and breast feeding isn't recommended. Cantharidin Pregnancy And Lactation Text: This medication has not been proven safe during pregnancy. It is unknown if this medication is excreted in breast milk. Tranexamic Acid Counseling:  Patient advised of the small risk of bleeding problems with tranexamic acid. They were also instructed to call if they developed any nausea, vomiting or diarrhea. All of the patient's questions and concerns were addressed. Cyclosporine Pregnancy And Lactation Text: This medication is Pregnancy Category C and it isn't know if it is safe during pregnancy. This medication is excreted in breast milk. Klisyri Counseling:  I discussed with the patient the risks of Klisyri including but not limited to erythema, scaling, itching, weeping, crusting, and pain. Methotrexate Counseling:  Patient counseled regarding adverse effects of methotrexate including but not limited to nausea, vomiting, abnormalities in liver function tests. Patients may develop mouth sores, rash, diarrhea, and abnormalities in blood counts. The patient understands that monitoring is required including LFT's and blood counts.  There is a rare possibility of scarring of the liver and lung problems that can occur when taking methotrexate. Persistent nausea, loss of appetite, pale stools, dark urine, cough, and shortness of breath should be reported immediately. Patient advised to discontinue methotrexate treatment at least three months before attempting to become pregnant.  I discussed the need for folate supplements while taking methotrexate.  These supplements can decrease side effects during methotrexate treatment. The patient verbalized understanding of the proper use and possible adverse effects of methotrexate.  All of the patient's questions and concerns were addressed. Arava Counseling:  Patient counseled regarding adverse effects of Arava including but not limited to nausea, vomiting, abnormalities in liver function tests. Patients may develop mouth sores, rash, diarrhea, and abnormalities in blood counts. The patient understands that monitoring is required including LFTs and blood counts.  There is a rare possibility of scarring of the liver and lung problems that can occur when taking methotrexate. Persistent nausea, loss of appetite, pale stools, dark urine, cough, and shortness of breath should be reported immediately. Patient advised to discontinue Arava treatment and consult with a physician prior to attempting conception. The patient will have to undergo a treatment to eliminate Arava from the body prior to conception. Cimzia Counseling:  I discussed with the patient the risks of Cimzia including but not limited to immunosuppression, allergic reactions and infections.  The patient understands that monitoring is required including a PPD at baseline and must alert us or the primary physician if symptoms of infection or other concerning signs are noted. Topical Metronidazole Counseling: Metronidazole is a topical antibiotic medication. You may experience burning, stinging, redness, or allergic reactions.  Please call our office if you develop any problems from using this medication. Soolantra Pregnancy And Lactation Text: This medication is Pregnancy Category C. This medication is considered safe during breast feeding. Hydroxyzine Counseling: Patient advised that the medication is sedating and not to drive a car after taking this medication.  Patient informed of potential adverse effects including but not limited to dry mouth, urinary retention, and blurry vision.  The patient verbalized understanding of the proper use and possible adverse effects of hydroxyzine.  All of the patient's questions and concerns were addressed. Niacinamide Counseling: I recommended taking niacin or niacinamide, also know as vitamin B3, twice daily. Recent evidence suggests that taking vitamin B3 (500 mg twice daily) can reduce the risk of actinic keratoses and non-melanoma skin cancers. Side effects of vitamin B3 include flushing and headache. Sarecycline Counseling: Patient advised regarding possible photosensitivity and discoloration of the teeth, skin, lips, tongue and gums.  Patient instructed to avoid sunlight, if possible.  When exposed to sunlight, patients should wear protective clothing, sunglasses, and sunscreen.  The patient was instructed to call the office immediately if the following severe adverse effects occur:  hearing changes, easy bruising/bleeding, severe headache, or vision changes.  The patient verbalized understanding of the proper use and possible adverse effects of sarecycline.  All of the patient's questions and concerns were addressed. Spironolactone Pregnancy And Lactation Text: This medication can cause feminization of the male fetus and should be avoided during pregnancy. The active metabolite is also found in breast milk. Aklief Pregnancy And Lactation Text: It is unknown if this medication is safe to use during pregnancy.  It is unknown if this medication is excreted in breast milk.  Breastfeeding women should use the topical cream on the smallest area of the skin for the shortest time needed while breastfeeding.  Do not apply to nipple and areola. Xolair Counseling:  Patient informed of potential adverse effects including but not limited to fever, muscle aches, rash and allergic reactions.  The patient verbalized understanding of the proper use and possible adverse effects of Xolair.  All of the patient's questions and concerns were addressed. Acitretin Pregnancy And Lactation Text: This medication is Pregnancy Category X and should not be given to women who are pregnant or may become pregnant in the future. This medication is excreted in breast milk. Bexarotene Counseling:  I discussed with the patient the risks of bexarotene including but not limited to hair loss, dry lips/skin/eyes, liver abnormalities, hyperlipidemia, pancreatitis, depression/suicidal ideation, photosensitivity, drug rash/allergic reactions, hypothyroidism, anemia, leukopenia, infection, cataracts, and teratogenicity.  Patient understands that they will need regular blood tests to check lipid profile, liver function tests, white blood cell count, thyroid function tests and pregnancy test if applicable. Protopic Counseling: Patient may experience a mild burning sensation during topical application. Protopic is not approved in children less than 2 years of age. There have been case reports of hematologic and skin malignancies in patients using topical calcineurin inhibitors although causality is questionable. Azithromycin Counseling:  I discussed with the patient the risks of azithromycin including but not limited to GI upset, allergic reaction, drug rash, diarrhea, and yeast infections. VTAMA Counseling: I discussed with the patient that VTAMA is not for use in the eyes, mouth or mouth. They should call the office if they develop any signs of allergic reactions to VTAMA. The patient verbalized understanding of the proper use and possible adverse effects of VTAMA.  All of the patient's questions and concerns were addressed. Oxybutynin Counseling:  I discussed with the patient the risks of oxybutynin including but not limited to skin rash, drowsiness, dry mouth, difficulty urinating, and blurred vision. Sarecycline Pregnancy And Lactation Text: This medication is Pregnancy Category D and not consider safe during pregnancy. It is also excreted in breast milk. Doxycycline Pregnancy And Lactation Text: This medication is Pregnancy Category D and not consider safe during pregnancy. It is also excreted in breast milk but is considered safe for shorter treatment courses. Olumiant Pregnancy And Lactation Text: Based on animal studies, Olumiant may cause embryo-fetal harm when administered to pregnant women.  The medication should not be used in pregnancy.  Breastfeeding is not recommended during treatment. Tranexamic Acid Pregnancy And Lactation Text: It is unknown if this medication is safe during pregnancy or breast feeding. Ilumya Counseling: I discussed with the patient the risks of tildrakizumab including but not limited to immunosuppression, malignancy, posterior leukoencephalopathy syndrome, and serious infections.  The patient understands that monitoring is required including a PPD at baseline and must alert us or the primary physician if symptoms of infection or other concerning signs are noted. Skyrizi Counseling: I discussed with the patient the risks of risankizumab-rzaa including but not limited to immunosuppression, and serious infections.  The patient understands that monitoring is required including a PPD at baseline and must alert us or the primary physician if symptoms of infection or other concerning signs are noted. Azathioprine Counseling:  I discussed with the patient the risks of azathioprine including but not limited to myelosuppression, immunosuppression, hepatotoxicity, lymphoma, and infections.  The patient understands that monitoring is required including baseline LFTs, Creatinine, possible TPMP genotyping and weekly CBCs for the first month and then every 2 weeks thereafter.  The patient verbalized understanding of the proper use and possible adverse effects of azathioprine.  All of the patient's questions and concerns were addressed. Rinvoq Counseling: I discussed with the patient the risks of Rinvoq therapy including but not limited to upper respiratory tract infections, shingles, cold sores, bronchitis, nausea, cough, fever, acne, and headache. Live vaccines should be avoided.  This medication has been linked to serious infections; higher rate of mortality; malignancy and lymphoproliferative disorders; major adverse cardiovascular events; thrombosis; thrombocytopenia, anemia, and neutropenia; lipid elevations; liver enzyme elevations; and gastrointestinal perforations. Valtrex Counseling: I discussed with the patient the risks of valacyclovir including but not limited to kidney damage, nausea, vomiting and severe allergy.  The patient understands that if the infection seems to be worsening or is not improving, they are to call. Topical Retinoid counseling:  Patient advised to apply a pea-sized amount only at bedtime and wait 30 minutes after washing their face before applying.  If too drying, patient may add a non-comedogenic moisturizer. The patient verbalized understanding of the proper use and possible adverse effects of retinoids.  All of the patient's questions and concerns were addressed. Hydroxyzine Pregnancy And Lactation Text: This medication is not safe during pregnancy and should not be taken. It is also excreted in breast milk and breast feeding isn't recommended. Niacinamide Pregnancy And Lactation Text: These medications are considered safe during pregnancy. Methotrexate Pregnancy And Lactation Text: This medication is Pregnancy Category X and is known to cause fetal harm. This medication is excreted in breast milk. Azelaic Acid Counseling: Patient counseled that medicine may cause skin irritation and to avoid applying near the eyes.  In the event of skin irritation, the patient was advised to reduce the amount of the drug applied or use it less frequently.   The patient verbalized understanding of the proper use and possible adverse effects of azelaic acid.  All of the patient's questions and concerns were addressed. Ketoconazole Counseling:   Patient counseled regarding improving absorption with orange juice.  Adverse effects include but are not limited to breast enlargement, headache, diarrhea, nausea, upset stomach, liver function test abnormalities, taste disturbance, and stomach pain.  There is a rare possibility of liver failure that can occur when taking ketoconazole. The patient understands that monitoring of LFTs may be required, especially at baseline. The patient verbalized understanding of the proper use and possible adverse effects of ketoconazole.  All of the patient's questions and concerns were addressed. Xolair Pregnancy And Lactation Text: This medication is Pregnancy Category B and is considered safe during pregnancy. This medication is excreted in breast milk. Cimzia Pregnancy And Lactation Text: This medication crosses the placenta but can be considered safe in certain situations. Cimzia may be excreted in breast milk. Klisyri Pregnancy And Lactation Text: It is unknown if this medication can harm a developing fetus or if it is excreted in breast milk. Topical Metronidazole Pregnancy And Lactation Text: This medication is Pregnancy Category B and considered safe during pregnancy.  It is also considered safe to use while breastfeeding. Cosentyx Counseling:  I discussed with the patient the risks of Cosentyx including but not limited to worsening of Crohn's disease, immunosuppression, allergic reactions and infections.  The patient understands that monitoring is required including a PPD at baseline and must alert us or the primary physician if symptoms of infection or other concerning signs are noted. Topical Steroids Counseling: I discussed with the patient that prolonged use of topical steroids can result in the increased appearance of superficial blood vessels (telangiectasias), lightening (hypopigmentation) and thinning of the skin (atrophy).  Patient understands to avoid using high potency steroids in skin folds, the groin or the face.  The patient verbalized understanding of the proper use and possible adverse effects of topical steroids.  All of the patient's questions and concerns were addressed. Nsaids Counseling: NSAID Counseling: I discussed with the patient that NSAIDs should be taken with food. Prolonged use of NSAIDs can result in the development of stomach ulcers.  Patient advised to stop taking NSAIDs if abdominal pain occurs.  The patient verbalized understanding of the proper use and possible adverse effects of NSAIDs.  All of the patient's questions and concerns were addressed. Tetracycline Counseling: Patient counseled regarding possible photosensitivity and increased risk for sunburn.  Patient instructed to avoid sunlight, if possible.  When exposed to sunlight, patients should wear protective clothing, sunglasses, and sunscreen.  The patient was instructed to call the office immediately if the following severe adverse effects occur:  hearing changes, easy bruising/bleeding, severe headache, or vision changes.  The patient verbalized understanding of the proper use and possible adverse effects of tetracycline.  All of the patient's questions and concerns were addressed. Patient understands to avoid pregnancy while on therapy due to potential birth defects. Azithromycin Pregnancy And Lactation Text: This medication is considered safe during pregnancy and is also secreted in breast milk. Minoxidil Counseling: Minoxidil is a topical medication which can increase blood flow where it is applied. It is uncertain how this medication increases hair growth. Side effects are uncommon and include stinging and allergic reactions. Erythromycin Counseling:  I discussed with the patient the risks of erythromycin including but not limited to GI upset, allergic reaction, drug rash, diarrhea, increase in liver enzymes, and yeast infections. Bexarotene Pregnancy And Lactation Text: This medication is Pregnancy Category X and should not be given to women who are pregnant or may become pregnant. This medication should not be used if you are breast feeding. Drysol Counseling:  I discussed with the patient the risks of drysol/aluminum chloride including but not limited to skin rash, itching, irritation, burning. Vtama Pregnancy And Lactation Text: It is unknown if this medication can cause problems during pregnancy and breastfeeding. Protopic Pregnancy And Lactation Text: This medication is Pregnancy Category C. It is unknown if this medication is excreted in breast milk when applied topically. Erythromycin Pregnancy And Lactation Text: This medication is Pregnancy Category B and is considered safe during pregnancy. It is also excreted in breast milk. Drysol Pregnancy And Lactation Text: This medication is considered safe during pregnancy and breast feeding. Zoryve Counseling:  I discussed with the patient that Zoryve is not for use in the eyes, mouth or vagina. The most commonly reported side effects include diarrhea, headache, insomnia, application site pain, upper respiratory tract infections, and urinary tract infections.  All of the patient's questions and concerns were addressed. Propranolol Counseling:  I discussed with the patient the risks of propranolol including but not limited to low heart rate, low blood pressure, low blood sugar, restlessness and increased cold sensitivity. They should call the office if they experience any of these side effects. Clofazimine Counseling:  I discussed with the patient the risks of clofazimine including but not limited to skin and eye pigmentation, liver damage, nausea/vomiting, gastrointestinal bleeding and allergy. Valtrex Pregnancy And Lactation Text: this medication is Pregnancy Category B and is considered safe during pregnancy. This medication is not directly found in breast milk but it's metabolite acyclovir is present. Qbrexza Counseling:  I discussed with the patient the risks of Qbrexza including but not limited to headache, mydriasis, blurred vision, dry eyes, nasal dryness, dry mouth, dry throat, dry skin, urinary hesitation, and constipation.  Local skin reactions including erythema, burning, stinging, and itching can also occur. Rinvoq Pregnancy And Lactation Text: Based on animal studies, Rinvoq may cause embryo-fetal harm when administered to pregnant women.  The medication should not be used in pregnancy.  Breastfeeding is not recommended during treatment and for 6 days after the last dose. Dutasteride Male Counseling: Dustasteride Counseling:  I discussed with the patient the risks of use of dutasteride including but not limited to decreased libido, decreased ejaculate volume, and gynecomastia. Women who can become pregnant should not handle medication.  All of the patient's questions and concerns were addressed. Ketoconazole Pregnancy And Lactation Text: This medication is Pregnancy Category C and it isn't know if it is safe during pregnancy. It is also excreted in breast milk and breast feeding isn't recommended. Prednisone Counseling:  I discussed with the patient the risks of prolonged use of prednisone including but not limited to weight gain, insomnia, osteoporosis, mood changes, diabetes, susceptibility to infection, glaucoma and high blood pressure.  In cases where prednisone use is prolonged, patients should be monitored with blood pressure checks, serum glucose levels and an eye exam.  Additionally, the patient may need to be placed on GI prophylaxis, PCP prophylaxis, and calcium and vitamin D supplementation and/or a bisphosphonate.  The patient verbalized understanding of the proper use and the possible adverse effects of prednisone.  All of the patient's questions and concerns were addressed. Terbinafine Counseling: Patient counseling regarding adverse effects of terbinafine including but not limited to headache, diarrhea, rash, upset stomach, liver function test abnormalities, itching, taste/smell disturbance, nausea, abdominal pain, and flatulence.  There is a rare possibility of liver failure that can occur when taking terbinafine.  The patient understands that a baseline LFT and kidney function test may be required. The patient verbalized understanding of the proper use and possible adverse effects of terbinafine.  All of the patient's questions and concerns were addressed. Dutasteride Pregnancy And Lactation Text: This medication is absolutely contraindicated in women, especially during pregnancy and breast feeding. Feminization of male fetuses is possible if taking while pregnant. Benzoyl Peroxide Counseling: Patient counseled that medicine may cause skin irritation and bleach clothing.  In the event of skin irritation, the patient was advised to reduce the amount of the drug applied or use it less frequently.   The patient verbalized understanding of the proper use and possible adverse effects of benzoyl peroxide.  All of the patient's questions and concerns were addressed. Topical Steroids Applications Pregnancy And Lactation Text: Most topical steroids are considered safe to use during pregnancy and lactation.  Any topical steroid applied to the breast or nipple should be washed off before breastfeeding. Nsaids Pregnancy And Lactation Text: These medications are considered safe up to 30 weeks gestation. It is excreted in breast milk. Erivedge Counseling- I discussed with the patient the risks of Erivedge including but not limited to nausea, vomiting, diarrhea, constipation, weight loss, changes in the sense of taste, decreased appetite, muscle spasms, and hair loss.  The patient verbalized understanding of the proper use and possible adverse effects of Erivedge.  All of the patient's questions and concerns were addressed. Albendazole Counseling:  I discussed with the patient the risks of albendazole including but not limited to cytopenia, kidney damage, nausea/vomiting and severe allergy.  The patient understands that this medication is being used in an off-label manner. Bactrim Counseling:  I discussed with the patient the risks of sulfa antibiotics including but not limited to GI upset, allergic reaction, drug rash, diarrhea, dizziness, photosensitivity, and yeast infections.  Rarely, more serious reactions can occur including but not limited to aplastic anemia, agranulocytosis, methemoglobinemia, blood dyscrasias, liver or kidney failure, lung infiltrates or desquamative/blistering drug rashes. Infliximab Counseling:  I discussed with the patient the risks of infliximab including but not limited to myelosuppression, immunosuppression, autoimmune hepatitis, demyelinating diseases, lymphoma, and serious infections.  The patient understands that monitoring is required including a PPD at baseline and must alert us or the primary physician if symptoms of infection or other concerning signs are noted. Isotretinoin Counseling: Patient should get monthly blood tests, not donate blood, not drive at night if vision affected, not share medication, and not undergo elective surgery for 6 months after tx completed. Side effects reviewed, pt to contact office should one occur. Isotretinoin Pregnancy And Lactation Text: This medication is Pregnancy Category X and is considered extremely dangerous during pregnancy. It is unknown if it is excreted in breast milk. Qbrexza Pregnancy And Lactation Text: There is no available data on Qbrexza use in pregnant women.  There is no available data on Qbrexza use in lactation. Propranolol Pregnancy And Lactation Text: This medication is Pregnancy Category C and it isn't known if it is safe during pregnancy. It is excreted in breast milk. Metronidazole Counseling:  I discussed with the patient the risks of metronidazole including but not limited to seizures, nausea/vomiting, a metallic taste in the mouth, nausea/vomiting and severe allergy. Elidel Counseling: Patient may experience a mild burning sensation during topical application. Elidel is not approved in children less than 2 years of age. There have been case reports of hematologic and skin malignancies in patients using topical calcineurin inhibitors although causality is questionable. Sotyktu Counseling:  I discussed the most common side effects of Sotyktu including: common cold, sore throat, sinus infections, cold sores, canker sores, folliculitis, and acne.  I also discussed more serious side effects of Sotyktu including but not limited to: serious allergic reactions; increased risk for infections such as TB; cancers such as lymphomas; rhabdomyolysis and elevated CPK; and elevated triglycerides and liver enzymes.  Glycopyrrolate Counseling:  I discussed with the patient the risks of glycopyrrolate including but not limited to skin rash, drowsiness, dry mouth, difficulty urinating, and blurred vision. Cellcept Counseling:  I discussed with the patient the risks of mycophenolate mofetil including but not limited to infection/immunosuppression, GI upset, hypokalemia, hypercholesterolemia, bone marrow suppression, lymphoproliferative disorders, malignancy, GI ulceration/bleed/perforation, colitis, interstitial lung disease, kidney failure, progressive multifocal leukoencephalopathy, and birth defects.  The patient understands that monitoring is required including a baseline creatinine and regular CBC testing. In addition, patient must alert us immediately if symptoms of infection or other concerning signs are noted. Eucrisa Counseling: Patient may experience a mild burning sensation during topical application. Eucrisa is not approved in children less than 3 months of age. Stelara Counseling:  I discussed with the patient the risks of ustekinumab including but not limited to immunosuppression, malignancy, posterior leukoencephalopathy syndrome, and serious infections.  The patient understands that monitoring is required including a PPD at baseline and must alert us or the primary physician if symptoms of infection or other concerning signs are noted. Tazorac Counseling:  Patient advised that medication is irritating and drying.  Patient may need to apply sparingly and wash off after an hour before eventually leaving it on overnight.  The patient verbalized understanding of the proper use and possible adverse effects of tazorac.  All of the patient's questions and concerns were addressed. Colchicine Counseling:  Patient counseled regarding adverse effects including but not limited to stomach upset (nausea, vomiting, stomach pain, or diarrhea).  Patient instructed to limit alcohol consumption while taking this medication.  Colchicine may reduce blood counts especially with prolonged use.  The patient understands that monitoring of kidney function and blood counts may be required, especially at baseline. The patient verbalized understanding of the proper use and possible adverse effects of colchicine.  All of the patient's questions and concerns were addressed. Tazorac Pregnancy And Lactation Text: This medication is not safe during pregnancy. It is unknown if this medication is excreted in breast milk. Glycopyrrolate Pregnancy And Lactation Text: This medication is Pregnancy Category B and is considered safe during pregnancy. It is unknown if it is excreted breast milk. Finasteride Male Counseling: Finasteride Counseling:  I discussed with the patient the risks of use of finasteride including but not limited to decreased libido, decreased ejaculate volume, gynecomastia, and depression. Women should not handle medication.  All of the patient's questions and concerns were addressed. Olanzapine Counseling- I discussed with the patient the common side effects of olanzapine including but are not limited to: lack of energy, dry mouth, increased appetite, sleepiness, tremor, constipation, dizziness, changes in behavior, or restlessness.  Explained that teenagers are more likely to experience headaches, abdominal pain, pain in the arms or legs, tiredness, and sleepiness.  Serious side effects include but are not limited: increased risk of death in elderly patients who are confused, have memory loss, or dementia-related psychosis; hyperglycemia; increased cholesterol and triglycerides; and weight gain. Bactrim Pregnancy And Lactation Text: This medication is Pregnancy Category D and is known to cause fetal risk.  It is also excreted in breast milk. Benzoyl Peroxide Pregnancy And Lactation Text: This medication is Pregnancy Category C. It is unknown if benzoyl peroxide is excreted in breast milk. Dupixent Counseling: I discussed with the patient the risks of dupilumab including but not limited to eye infection and irritation, cold sores, injection site reactions, worsening of asthma, allergic reactions and increased risk of parasitic infection.  Live vaccines should be avoided while taking dupilumab. Dupilumab will also interact with certain medications such as warfarin and cyclosporine. The patient understands that monitoring is required and they must alert us or the primary physician if symptoms of infection or other concerning signs are noted. Topical Sulfur Applications Counseling: Topical Sulfur Counseling: Patient counseled that this medication may cause skin irritation or allergic reactions.  In the event of skin irritation, the patient was advised to reduce the amount of the drug applied or use it less frequently.   The patient verbalized understanding of the proper use and possible adverse effects of topical sulfur application.  All of the patient's questions and concerns were addressed. Mirvaso Counseling: Mirvaso is a topical medication which can decrease superficial blood flow where applied. Side effects are uncommon and include stinging, redness and allergic reactions.

## 2024-02-10 ENCOUNTER — HEALTH MAINTENANCE LETTER (OUTPATIENT)
Age: 44
End: 2024-02-10

## 2024-03-05 NOTE — TELEPHONE ENCOUNTER
Documents emailed to patient.  
Neck , no lymphadenopathy
[facility]    Phone Message    May a detailed message be left on voicemail: yes     Reason for Call: Other: Pt of Dr. Timmons is calling in requesting the lab orders, the After visit summary and the letter for her work all emailed to her today.      I did let pt know i'm not sure they can email you this information and pt stated the mail takes too long.    I said I will send the request and they will call you if not able to send this information through your email, pt agreed    Action Taken: Message routed to:  [facility] Endo    Travel Screening: Not Applicable                                                                      
[Follow-Up Visit] : a follow-up

## 2024-04-02 DIAGNOSIS — E03.8 CENTRAL HYPOTHYROIDISM: Primary | ICD-10-CM

## 2024-04-02 RX ORDER — LEVOTHYROXINE SODIUM 112 UG/1
112 TABLET ORAL DAILY
Qty: 90 TABLET | Refills: 4 | Status: SHIPPED | OUTPATIENT
Start: 2024-04-02

## 2024-04-02 NOTE — TELEPHONE ENCOUNTER
Current order is historical. No lab review noted.  Rx pended to provider per pt request.   Kanika Mota, RN on 4/2/2024 at 10:55 AM       [facility]    Phone Message    May a detailed message be left on voicemail: yes     Reason for Call: Medication Refill Request    Has the patient contacted the pharmacy for the refill? Yes   Name of medication being requested:   levothyroxine (SYNTHROID/LEVOTHROID) 112 MCG tablet       Provider who prescribed the medication:   Lyla Timmons MD       Pharmacy:   [id number] [place], [place] - [address] [place] [facility] [address]     Date medication is needed: asap     The patient is completely out and the pharmacy is declining to give her an emergency supply, she will try again by asking for supervisor at pharmacy, in the meantime she's out thank you.    Action Taken: Message routed to:  [facility] Endo    Travel Screening: Not Applicable

## 2024-06-29 ENCOUNTER — HEALTH MAINTENANCE LETTER (OUTPATIENT)
Age: 44
End: 2024-06-29

## 2024-07-27 ENCOUNTER — OFFICE VISIT (OUTPATIENT)
Dept: URGENT CARE | Facility: URGENT CARE | Age: 44
End: 2024-07-27
Payer: COMMERCIAL

## 2024-07-27 VITALS
TEMPERATURE: 98.3 F | DIASTOLIC BLOOD PRESSURE: 75 MMHG | SYSTOLIC BLOOD PRESSURE: 134 MMHG | OXYGEN SATURATION: 98 % | HEART RATE: 85 BPM

## 2024-07-27 DIAGNOSIS — N30.01 ACUTE CYSTITIS WITH HEMATURIA: Primary | ICD-10-CM

## 2024-07-27 DIAGNOSIS — R30.0 DYSURIA: ICD-10-CM

## 2024-07-27 LAB
ALBUMIN UR-MCNC: 30 MG/DL
APPEARANCE UR: ABNORMAL
BACTERIA #/AREA URNS HPF: ABNORMAL /HPF
BILIRUB UR QL STRIP: NEGATIVE
COLOR UR AUTO: YELLOW
GLUCOSE UR STRIP-MCNC: NEGATIVE MG/DL
HGB UR QL STRIP: ABNORMAL
KETONES UR STRIP-MCNC: NEGATIVE MG/DL
LEUKOCYTE ESTERASE UR QL STRIP: ABNORMAL
NITRATE UR QL: NEGATIVE
PH UR STRIP: 5.5 [PH] (ref 5–7)
RBC #/AREA URNS AUTO: ABNORMAL /HPF
SP GR UR STRIP: 1.01 (ref 1–1.03)
SQUAMOUS #/AREA URNS AUTO: ABNORMAL /LPF
UROBILINOGEN UR STRIP-ACNC: 0.2 E.U./DL
WBC #/AREA URNS AUTO: >100 /HPF

## 2024-07-27 PROCEDURE — 99213 OFFICE O/P EST LOW 20 MIN: CPT | Performed by: FAMILY MEDICINE

## 2024-07-27 PROCEDURE — 87086 URINE CULTURE/COLONY COUNT: CPT | Performed by: FAMILY MEDICINE

## 2024-07-27 PROCEDURE — 81001 URINALYSIS AUTO W/SCOPE: CPT

## 2024-07-27 RX ORDER — PHENAZOPYRIDINE HYDROCHLORIDE 200 MG/1
200 TABLET, FILM COATED ORAL 3 TIMES DAILY PRN
Qty: 9 TABLET | Refills: 1 | Status: SHIPPED | OUTPATIENT
Start: 2024-07-27

## 2024-07-27 RX ORDER — SULFAMETHOXAZOLE/TRIMETHOPRIM 800-160 MG
1 TABLET ORAL 2 TIMES DAILY
Qty: 14 TABLET | Refills: 0 | Status: SHIPPED | OUTPATIENT
Start: 2024-07-27 | End: 2024-08-03

## 2024-07-27 NOTE — PROGRESS NOTES
SUBJECTIVE:   Octavio Jesus is a 44 year old female who  presents today for a possible UTI. Symptoms of frequent urinary urgency and dysuria have been going on for the past four days.  Hematuria none..  There is no history of fever, chills, nausea or vomiting.  . This patient does have a history of urinary tract infections, especially after sexual intercourse.      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 270 tablet 4    levothyroxine (SYNTHROID/LEVOTHROID) 112 MCG tablet Take 1 tablet (112 mcg) by mouth daily 90 tablet 4    vitamin D3 (CHOLECALCIFEROL) 1000 units (25 mcg) tablet Take 1 tablet (1,000 Units) by mouth daily 200 tablet 1    estradiol (VIVELLE-DOT) 0.05 MG/24HR bi-weekly patch Place 1 patch onto the skin twice a week (Patient not taking: Reported on 7/27/2024) 8 patch 11    meloxicam (MOBIC) 7.5 MG tablet  (Patient not taking: Reported on 7/27/2024)      progesterone (PROMETRIUM) 200 MG capsule Take one tablet/day at bedtime, days 1-12 each month, starting in December, 2023. (Patient not taking: Reported on 7/27/2024) 12 capsule 11     Social History     Tobacco Use    Smoking status: Never    Smokeless tobacco: Not on file   Substance Use Topics    Alcohol use: No       ROS:   CONSTITUTIONAL:negative for fevers.    :  positive for urinary urgency, dysuria.      OBJECTIVE:  /75 (BP Location: Right arm, Patient Position: Sitting, Cuff Size: Adult Large)   Pulse 85   Temp 98.3  F (36.8  C) (Tympanic)   SpO2 98%   GENERAL APPEARANCE: healthy, alert and no  distress    LAB:    Results for orders placed or performed in visit on 07/27/24   UA Macroscopic with reflex to Microscopic and Culture - Clinic Collect     Status: Abnormal    Specimen: Urine, Clean Catch   Result Value Ref Range    Color Urine Yellow Colorless, Straw, Light Yellow, Yellow    Appearance Urine Cloudy (A) Clear    Glucose Urine Negative Negative mg/dL    Bilirubin Urine Negative Negative    Ketones Urine Negative Negative mg/dL    Specific Gravity Urine 1.010 1.003 - 1.035    Blood Urine Large (A) Negative    pH Urine 5.5 5.0 - 7.0    Protein Albumin Urine 30 (A) Negative mg/dL    Urobilinogen Urine 0.2 0.2, 1.0 E.U./dL    Nitrite Urine Negative Negative    Leukocyte Esterase Urine Small (A) Negative   UA Microscopic with Reflex to Culture     Status: Abnormal   Result Value Ref Range    Bacteria Urine Few (A) None Seen /HPF    RBC Urine 5-10 (A) 0-2 /HPF /HPF    WBC Urine >100 (A) 0-5 /HPF /HPF    Squamous Epithelials Urine Few (A) None Seen /LPF         ASSESSMENT:   Acute Cystitis with Hematuria    PLAN:  Rx:  Bactrim DS    Drink plenty of water    Follow up if you have severe vomiting/kidney pain/fevers.      Follow up if not better in 5-6 days.      Pending lab:  Urine culture     Guzman Garcia MD

## 2024-07-27 NOTE — PATIENT INSTRUCTIONS
Drink plenty of water    Follow up if you have severe vomiting/kidney pain/fevers.      Follow up if not better in 5-6 days.     total knee replacement left knee surgery left knee surgery

## 2024-07-29 LAB — BACTERIA UR CULT: NORMAL
